# Patient Record
Sex: FEMALE | Race: WHITE | Employment: FULL TIME | URBAN - METROPOLITAN AREA
[De-identification: names, ages, dates, MRNs, and addresses within clinical notes are randomized per-mention and may not be internally consistent; named-entity substitution may affect disease eponyms.]

---

## 2017-03-17 ENCOUNTER — OFFICE VISIT (OUTPATIENT)
Dept: FAMILY MEDICINE CLINIC | Age: 52
End: 2017-03-17
Payer: COMMERCIAL

## 2017-03-17 VITALS
HEART RATE: 84 BPM | OXYGEN SATURATION: 98 % | WEIGHT: 202 LBS | DIASTOLIC BLOOD PRESSURE: 78 MMHG | SYSTOLIC BLOOD PRESSURE: 128 MMHG | BODY MASS INDEX: 31.71 KG/M2 | HEIGHT: 67 IN | RESPIRATION RATE: 16 BRPM

## 2017-03-17 DIAGNOSIS — E78.5 HYPERLIPIDEMIA, UNSPECIFIED HYPERLIPIDEMIA TYPE: ICD-10-CM

## 2017-03-17 DIAGNOSIS — E11.9 TYPE 2 DIABETES MELLITUS WITHOUT COMPLICATION, WITHOUT LONG-TERM CURRENT USE OF INSULIN (HCC): Primary | ICD-10-CM

## 2017-03-17 DIAGNOSIS — E78.2 MIXED HYPERLIPIDEMIA: ICD-10-CM

## 2017-03-17 PROCEDURE — 99214 OFFICE O/P EST MOD 30 MIN: CPT | Performed by: FAMILY MEDICINE

## 2017-03-17 ASSESSMENT — PATIENT HEALTH QUESTIONNAIRE - PHQ9
SUM OF ALL RESPONSES TO PHQ QUESTIONS 1-9: 0
SUM OF ALL RESPONSES TO PHQ9 QUESTIONS 1 & 2: 0
2. FEELING DOWN, DEPRESSED OR HOPELESS: 0
1. LITTLE INTEREST OR PLEASURE IN DOING THINGS: 0

## 2017-03-18 PROBLEM — E78.2 MIXED HYPERLIPIDEMIA: Status: ACTIVE | Noted: 2017-03-18

## 2017-03-18 PROBLEM — E11.9 DIABETES MELLITUS (HCC): Status: ACTIVE | Noted: 2017-03-18

## 2017-03-18 RX ORDER — GLIMEPIRIDE 4 MG/1
TABLET ORAL
Qty: 1 TABLET | Refills: 0
Start: 2017-03-18 | End: 2017-08-02 | Stop reason: SDUPTHER

## 2017-03-20 ENCOUNTER — HOSPITAL ENCOUNTER (OUTPATIENT)
Age: 52
Setting detail: SPECIMEN
Discharge: HOME | End: 2017-03-20
Payer: COMMERCIAL

## 2017-03-20 DIAGNOSIS — E11.9 TYPE 2 DIABETES MELLITUS WITHOUT COMPLICATION, WITHOUT LONG-TERM CURRENT USE OF INSULIN (HCC): ICD-10-CM

## 2017-03-20 DIAGNOSIS — E78.5 HYPERLIPIDEMIA, UNSPECIFIED HYPERLIPIDEMIA TYPE: ICD-10-CM

## 2017-03-20 LAB
ANION GAP SERPL CALCULATED.3IONS-SCNC: 13 MMOL/L (ref 9–17)
BUN BLDV-MCNC: 15 MG/DL (ref 6–20)
BUN/CREAT BLD: ABNORMAL (ref 9–20)
CALCIUM SERPL-MCNC: 9.3 MG/DL (ref 8.6–10.4)
CHLORIDE BLD-SCNC: 99 MMOL/L (ref 98–107)
CHOLESTEROL/HDL RATIO: 2.5
CHOLESTEROL: 196 MG/DL
CO2: 27 MMOL/L (ref 20–31)
CREAT SERPL-MCNC: 0.94 MG/DL (ref 0.5–0.9)
ESTIMATED AVERAGE GLUCOSE: 137 MG/DL
GFR AFRICAN AMERICAN: >60 ML/MIN
GFR NON-AFRICAN AMERICAN: >60 ML/MIN
GFR SERPL CREATININE-BSD FRML MDRD: ABNORMAL ML/MIN/{1.73_M2}
GFR SERPL CREATININE-BSD FRML MDRD: ABNORMAL ML/MIN/{1.73_M2}
GLUCOSE BLD-MCNC: 122 MG/DL (ref 70–99)
HBA1C MFR BLD: 6.4 % (ref 4–6)
HDLC SERPL-MCNC: 79 MG/DL
LDL CHOLESTEROL: 98 MG/DL (ref 0–130)
POTASSIUM SERPL-SCNC: 5.3 MMOL/L (ref 3.7–5.3)
SODIUM BLD-SCNC: 139 MMOL/L (ref 135–144)
TRIGL SERPL-MCNC: 93 MG/DL
VLDLC SERPL CALC-MCNC: NORMAL MG/DL (ref 1–30)

## 2017-06-16 RX ORDER — ATORVASTATIN CALCIUM 20 MG/1
20 TABLET, FILM COATED ORAL DAILY
Qty: 90 TABLET | Refills: 3 | Status: SHIPPED | OUTPATIENT
Start: 2017-06-16 | End: 2018-07-12 | Stop reason: SDUPTHER

## 2017-06-29 ENCOUNTER — HOSPITAL ENCOUNTER (OUTPATIENT)
Dept: MAMMOGRAPHY | Age: 52
Discharge: HOME | End: 2017-06-29
Payer: COMMERCIAL

## 2017-06-29 DIAGNOSIS — Z12.39 SCREENING FOR MALIGNANT NEOPLASM OF BREAST: ICD-10-CM

## 2017-06-29 PROCEDURE — 77063 BREAST TOMOSYNTHESIS BI: CPT

## 2017-08-02 RX ORDER — GLIMEPIRIDE 4 MG/1
TABLET ORAL
Qty: 90 TABLET | Refills: 1 | Status: SHIPPED | OUTPATIENT
Start: 2017-08-02 | End: 2017-08-30

## 2017-08-30 ENCOUNTER — OFFICE VISIT (OUTPATIENT)
Dept: FAMILY MEDICINE CLINIC | Age: 52
End: 2017-08-30
Payer: COMMERCIAL

## 2017-08-30 VITALS
DIASTOLIC BLOOD PRESSURE: 76 MMHG | WEIGHT: 195.2 LBS | BODY MASS INDEX: 30.64 KG/M2 | OXYGEN SATURATION: 98 % | HEART RATE: 96 BPM | HEIGHT: 67 IN | SYSTOLIC BLOOD PRESSURE: 110 MMHG

## 2017-08-30 DIAGNOSIS — M25.512 ACUTE PAIN OF LEFT SHOULDER: Primary | ICD-10-CM

## 2017-08-30 DIAGNOSIS — M25.522 LEFT ELBOW PAIN: ICD-10-CM

## 2017-08-30 DIAGNOSIS — E11.9 TYPE 2 DIABETES MELLITUS WITHOUT COMPLICATION, WITHOUT LONG-TERM CURRENT USE OF INSULIN (HCC): ICD-10-CM

## 2017-08-30 PROCEDURE — 99213 OFFICE O/P EST LOW 20 MIN: CPT | Performed by: FAMILY MEDICINE

## 2017-08-30 ASSESSMENT — ENCOUNTER SYMPTOMS
ABDOMINAL PAIN: 0
SORE THROAT: 0
COLOR CHANGE: 0
EYE REDNESS: 0
CHEST TIGHTNESS: 0
SHORTNESS OF BREATH: 0
BLOOD IN STOOL: 0
EYE DISCHARGE: 0
EYE ITCHING: 0
NAUSEA: 0
VOICE CHANGE: 0
BACK PAIN: 0
COUGH: 0
SINUS PRESSURE: 0
EYE PAIN: 0
RHINORRHEA: 0
FACIAL SWELLING: 0
VOMITING: 0
PHOTOPHOBIA: 0
WHEEZING: 0
DIARRHEA: 0
ABDOMINAL DISTENTION: 0
CONSTIPATION: 0

## 2017-09-12 ENCOUNTER — TELEPHONE (OUTPATIENT)
Dept: FAMILY MEDICINE CLINIC | Age: 52
End: 2017-09-12

## 2017-09-15 ENCOUNTER — OFFICE VISIT (OUTPATIENT)
Dept: FAMILY MEDICINE CLINIC | Age: 52
End: 2017-09-15
Payer: COMMERCIAL

## 2017-09-15 VITALS
BODY MASS INDEX: 30.51 KG/M2 | OXYGEN SATURATION: 98 % | WEIGHT: 194.4 LBS | HEIGHT: 67 IN | DIASTOLIC BLOOD PRESSURE: 78 MMHG | HEART RATE: 81 BPM | SYSTOLIC BLOOD PRESSURE: 120 MMHG

## 2017-09-15 DIAGNOSIS — M75.52 BURSITIS OF LEFT SHOULDER: Primary | ICD-10-CM

## 2017-09-15 PROCEDURE — 20610 DRAIN/INJ JOINT/BURSA W/O US: CPT | Performed by: FAMILY MEDICINE

## 2017-09-15 RX ORDER — TRIAMCINOLONE ACETONIDE 40 MG/ML
120 INJECTION, SUSPENSION INTRA-ARTICULAR; INTRAMUSCULAR ONCE
Status: COMPLETED | OUTPATIENT
Start: 2017-09-15 | End: 2017-09-15

## 2017-09-15 RX ADMIN — TRIAMCINOLONE ACETONIDE 120 MG: 40 INJECTION, SUSPENSION INTRA-ARTICULAR; INTRAMUSCULAR at 07:36

## 2017-09-15 ASSESSMENT — ENCOUNTER SYMPTOMS
EYE DISCHARGE: 0
COUGH: 0
WHEEZING: 0
ABDOMINAL DISTENTION: 0
EYE REDNESS: 0
VOICE CHANGE: 0
CONSTIPATION: 0
RHINORRHEA: 0
EYE ITCHING: 0
BLOOD IN STOOL: 0
FACIAL SWELLING: 0
NAUSEA: 0
SINUS PRESSURE: 0
EYE PAIN: 0
DIARRHEA: 0
COLOR CHANGE: 0
SORE THROAT: 0
SHORTNESS OF BREATH: 0
CHEST TIGHTNESS: 0
ABDOMINAL PAIN: 0
VOMITING: 0
BACK PAIN: 0
PHOTOPHOBIA: 0

## 2017-09-18 ENCOUNTER — TELEPHONE (OUTPATIENT)
Dept: FAMILY MEDICINE CLINIC | Age: 52
End: 2017-09-18

## 2017-10-03 ENCOUNTER — TELEPHONE (OUTPATIENT)
Dept: FAMILY MEDICINE CLINIC | Age: 52
End: 2017-10-03

## 2017-10-03 NOTE — TELEPHONE ENCOUNTER
I did send them over and the problem was that she has a high deductible plan so it was maxing out the card benefit and didn't bring it down enough. So the options that in light of the high deductible plan would be either keep giving her samples or we could change her to a medication where they cover the whole cost on the copay card (but the options there would be limited).

## 2017-10-24 ENCOUNTER — TELEPHONE (OUTPATIENT)
Dept: FAMILY MEDICINE CLINIC | Age: 52
End: 2017-10-24

## 2017-11-17 ENCOUNTER — TELEPHONE (OUTPATIENT)
Dept: FAMILY MEDICINE CLINIC | Age: 52
End: 2017-11-17

## 2017-11-17 NOTE — TELEPHONE ENCOUNTER
That's fine (just an FYI though, if she's only taking it once a day then the 100/1000 dose would be more appropriate).

## 2017-12-19 ENCOUNTER — PATIENT MESSAGE (OUTPATIENT)
Dept: FAMILY MEDICINE CLINIC | Age: 52
End: 2017-12-19

## 2018-01-18 ENCOUNTER — PATIENT MESSAGE (OUTPATIENT)
Dept: FAMILY MEDICINE CLINIC | Age: 53
End: 2018-01-18

## 2018-06-26 ENCOUNTER — HOSPITAL ENCOUNTER (OUTPATIENT)
Dept: MAMMOGRAPHY | Age: 53
Discharge: HOME | End: 2018-06-28
Payer: COMMERCIAL

## 2018-06-26 DIAGNOSIS — Z12.31 VISIT FOR SCREENING MAMMOGRAM: ICD-10-CM

## 2018-06-26 PROCEDURE — 77063 BREAST TOMOSYNTHESIS BI: CPT

## 2018-07-13 RX ORDER — ATORVASTATIN CALCIUM 20 MG/1
TABLET, FILM COATED ORAL
Qty: 90 TABLET | Refills: 3 | Status: SHIPPED | OUTPATIENT
Start: 2018-07-13 | End: 2019-04-30 | Stop reason: SDUPTHER

## 2018-11-30 ENCOUNTER — TELEPHONE (OUTPATIENT)
Dept: FAMILY MEDICINE CLINIC | Age: 53
End: 2018-11-30

## 2019-02-25 ENCOUNTER — OFFICE VISIT (OUTPATIENT)
Dept: FAMILY MEDICINE CLINIC | Age: 54
End: 2019-02-25
Payer: COMMERCIAL

## 2019-02-25 VITALS
OXYGEN SATURATION: 97 % | HEIGHT: 66 IN | SYSTOLIC BLOOD PRESSURE: 130 MMHG | DIASTOLIC BLOOD PRESSURE: 80 MMHG | BODY MASS INDEX: 31.08 KG/M2 | WEIGHT: 193.4 LBS | HEART RATE: 95 BPM

## 2019-02-25 DIAGNOSIS — E11.9 TYPE 2 DIABETES MELLITUS WITHOUT COMPLICATION, WITHOUT LONG-TERM CURRENT USE OF INSULIN (HCC): ICD-10-CM

## 2019-02-25 DIAGNOSIS — E78.2 MIXED HYPERLIPIDEMIA: ICD-10-CM

## 2019-02-25 DIAGNOSIS — Z11.4 SCREENING FOR HIV (HUMAN IMMUNODEFICIENCY VIRUS): ICD-10-CM

## 2019-02-25 DIAGNOSIS — Z23 IMMUNIZATION DUE: ICD-10-CM

## 2019-02-25 DIAGNOSIS — Z11.59 ENCOUNTER FOR HEPATITIS C SCREENING TEST FOR LOW RISK PATIENT: ICD-10-CM

## 2019-02-25 DIAGNOSIS — Z00.01 ENCOUNTER FOR GENERAL ADULT MEDICAL EXAMINATION WITH ABNORMAL FINDINGS: Primary | ICD-10-CM

## 2019-02-25 DIAGNOSIS — Z51.81 MEDICATION MONITORING ENCOUNTER: ICD-10-CM

## 2019-02-25 LAB
CREATININE URINE POCT: 50
HBA1C MFR BLD: 6.6 %
MICROALBUMIN/CREAT 24H UR: 10 MG/G{CREAT}
MICROALBUMIN/CREAT UR-RTO: 30

## 2019-02-25 PROCEDURE — 99396 PREV VISIT EST AGE 40-64: CPT | Performed by: FAMILY MEDICINE

## 2019-02-25 PROCEDURE — 83036 HEMOGLOBIN GLYCOSYLATED A1C: CPT | Performed by: FAMILY MEDICINE

## 2019-02-25 PROCEDURE — 82044 UR ALBUMIN SEMIQUANTITATIVE: CPT | Performed by: FAMILY MEDICINE

## 2019-02-25 RX ORDER — LISINOPRIL 2.5 MG/1
2.5 TABLET ORAL DAILY
Qty: 90 TABLET | Refills: 3 | Status: SHIPPED | OUTPATIENT
Start: 2019-02-25 | End: 2020-01-17

## 2019-02-25 RX ORDER — IBUPROFEN 800 MG/1
800 TABLET ORAL 3 TIMES DAILY PRN
Qty: 270 TABLET | Refills: 1 | Status: SHIPPED | OUTPATIENT
Start: 2019-02-25 | End: 2021-07-13 | Stop reason: SDUPTHER

## 2019-02-25 ASSESSMENT — ENCOUNTER SYMPTOMS
PHOTOPHOBIA: 0
VOMITING: 0
CONSTIPATION: 0
COUGH: 0
EYE PAIN: 0
SORE THROAT: 0
BACK PAIN: 0
VOICE CHANGE: 0
SINUS PRESSURE: 0
RHINORRHEA: 0
EYE REDNESS: 0
BLOOD IN STOOL: 0
FACIAL SWELLING: 0
EYE ITCHING: 0
ABDOMINAL DISTENTION: 0
WHEEZING: 0
ABDOMINAL PAIN: 0
EYE DISCHARGE: 0
COLOR CHANGE: 0
NAUSEA: 0
SHORTNESS OF BREATH: 0
DIARRHEA: 0
CHEST TIGHTNESS: 0

## 2019-02-25 ASSESSMENT — PATIENT HEALTH QUESTIONNAIRE - PHQ9
SUM OF ALL RESPONSES TO PHQ QUESTIONS 1-9: 0
SUM OF ALL RESPONSES TO PHQ QUESTIONS 1-9: 0
1. LITTLE INTEREST OR PLEASURE IN DOING THINGS: 0
2. FEELING DOWN, DEPRESSED OR HOPELESS: 0
SUM OF ALL RESPONSES TO PHQ9 QUESTIONS 1 & 2: 0

## 2019-02-27 ENCOUNTER — HOSPITAL ENCOUNTER (OUTPATIENT)
Age: 54
Setting detail: SPECIMEN
Discharge: HOME | End: 2019-02-27
Payer: COMMERCIAL

## 2019-02-27 DIAGNOSIS — Z11.59 ENCOUNTER FOR HEPATITIS C SCREENING TEST FOR LOW RISK PATIENT: ICD-10-CM

## 2019-02-27 DIAGNOSIS — E78.2 MIXED HYPERLIPIDEMIA: ICD-10-CM

## 2019-02-27 DIAGNOSIS — Z00.01 ENCOUNTER FOR GENERAL ADULT MEDICAL EXAMINATION WITH ABNORMAL FINDINGS: ICD-10-CM

## 2019-02-27 DIAGNOSIS — Z11.4 SCREENING FOR HIV (HUMAN IMMUNODEFICIENCY VIRUS): ICD-10-CM

## 2019-02-27 DIAGNOSIS — Z51.81 MEDICATION MONITORING ENCOUNTER: ICD-10-CM

## 2019-02-27 LAB
ALBUMIN SERPL-MCNC: 4.5 G/DL (ref 3.5–5.2)
ALBUMIN/GLOBULIN RATIO: 1.8 (ref 1–2.5)
ALP BLD-CCNC: 105 U/L (ref 35–104)
ALT SERPL-CCNC: 20 U/L (ref 5–33)
ANION GAP SERPL CALCULATED.3IONS-SCNC: 14 MMOL/L (ref 9–17)
AST SERPL-CCNC: 20 U/L
BILIRUB SERPL-MCNC: 0.42 MG/DL (ref 0.3–1.2)
BUN BLDV-MCNC: 14 MG/DL (ref 6–20)
BUN/CREAT BLD: ABNORMAL (ref 9–20)
CALCIUM SERPL-MCNC: 9.5 MG/DL (ref 8.6–10.4)
CHLORIDE BLD-SCNC: 105 MMOL/L (ref 98–107)
CHOLESTEROL/HDL RATIO: 2.5
CHOLESTEROL: 189 MG/DL
CO2: 23 MMOL/L (ref 20–31)
CREAT SERPL-MCNC: 0.86 MG/DL (ref 0.5–0.9)
GFR AFRICAN AMERICAN: >60 ML/MIN
GFR NON-AFRICAN AMERICAN: >60 ML/MIN
GFR SERPL CREATININE-BSD FRML MDRD: ABNORMAL ML/MIN/{1.73_M2}
GFR SERPL CREATININE-BSD FRML MDRD: ABNORMAL ML/MIN/{1.73_M2}
GLUCOSE BLD-MCNC: 126 MG/DL (ref 70–99)
HDLC SERPL-MCNC: 76 MG/DL
HEPATITIS C ANTIBODY: NONREACTIVE
HIV AG/AB: NONREACTIVE
LDL CHOLESTEROL: 100 MG/DL (ref 0–130)
POTASSIUM SERPL-SCNC: 4.8 MMOL/L (ref 3.7–5.3)
SODIUM BLD-SCNC: 142 MMOL/L (ref 135–144)
TOTAL PROTEIN: 7 G/DL (ref 6.4–8.3)
TRIGL SERPL-MCNC: 66 MG/DL
TSH SERPL DL<=0.05 MIU/L-ACNC: 3.59 MIU/L (ref 0.3–5)
VLDLC SERPL CALC-MCNC: NORMAL MG/DL (ref 1–30)

## 2019-04-30 RX ORDER — ATORVASTATIN CALCIUM 20 MG/1
20 TABLET, FILM COATED ORAL DAILY
Qty: 90 TABLET | Refills: 3 | Status: SHIPPED | OUTPATIENT
Start: 2019-04-30 | End: 2019-05-21

## 2019-08-08 ENCOUNTER — HOSPITAL ENCOUNTER (OUTPATIENT)
Dept: MAMMOGRAPHY | Age: 54
Discharge: HOME | End: 2019-08-10
Payer: COMMERCIAL

## 2019-08-08 DIAGNOSIS — Z12.31 ENCOUNTER FOR SCREENING MAMMOGRAM FOR BREAST CANCER: ICD-10-CM

## 2019-08-08 PROCEDURE — 77063 BREAST TOMOSYNTHESIS BI: CPT

## 2019-08-26 ENCOUNTER — NURSE ONLY (OUTPATIENT)
Dept: FAMILY MEDICINE CLINIC | Age: 54
End: 2019-08-26
Payer: COMMERCIAL

## 2019-08-26 DIAGNOSIS — E11.9 TYPE 2 DIABETES MELLITUS WITHOUT COMPLICATION, WITHOUT LONG-TERM CURRENT USE OF INSULIN (HCC): Primary | ICD-10-CM

## 2019-08-26 LAB — HBA1C MFR BLD: 6.5 %

## 2019-08-26 PROCEDURE — 83036 HEMOGLOBIN GLYCOSYLATED A1C: CPT | Performed by: FAMILY MEDICINE

## 2020-01-17 ENCOUNTER — OFFICE VISIT (OUTPATIENT)
Dept: FAMILY MEDICINE CLINIC | Age: 55
End: 2020-01-17
Payer: COMMERCIAL

## 2020-01-17 VITALS
BODY MASS INDEX: 31.54 KG/M2 | OXYGEN SATURATION: 98 % | HEART RATE: 77 BPM | DIASTOLIC BLOOD PRESSURE: 70 MMHG | SYSTOLIC BLOOD PRESSURE: 110 MMHG | WEIGHT: 195.4 LBS | TEMPERATURE: 97.6 F

## 2020-01-17 PROBLEM — R87.619 ABNORMAL CERVICAL PAPANICOLAOU SMEAR: Status: ACTIVE | Noted: 2017-05-24

## 2020-01-17 LAB — HBA1C MFR BLD: 6.8 %

## 2020-01-17 PROCEDURE — 83036 HEMOGLOBIN GLYCOSYLATED A1C: CPT | Performed by: NURSE PRACTITIONER

## 2020-01-17 PROCEDURE — 99214 OFFICE O/P EST MOD 30 MIN: CPT | Performed by: NURSE PRACTITIONER

## 2020-01-17 RX ORDER — ATORVASTATIN CALCIUM 40 MG/1
40 TABLET, FILM COATED ORAL DAILY
Qty: 90 TABLET | Refills: 1 | Status: SHIPPED | OUTPATIENT
Start: 2020-01-17 | End: 2020-08-03

## 2020-01-17 ASSESSMENT — PATIENT HEALTH QUESTIONNAIRE - PHQ9
SUM OF ALL RESPONSES TO PHQ QUESTIONS 1-9: 0
2. FEELING DOWN, DEPRESSED OR HOPELESS: 0
1. LITTLE INTEREST OR PLEASURE IN DOING THINGS: 0
SUM OF ALL RESPONSES TO PHQ9 QUESTIONS 1 & 2: 0
SUM OF ALL RESPONSES TO PHQ QUESTIONS 1-9: 0

## 2020-01-17 ASSESSMENT — ENCOUNTER SYMPTOMS
SHORTNESS OF BREATH: 0
COUGH: 0

## 2020-01-17 NOTE — PROGRESS NOTES
Patient is present to establish care  No concerns at this time    Pharmacy and medication reviewed with patient

## 2020-01-28 ENCOUNTER — HOSPITAL ENCOUNTER (EMERGENCY)
Age: 55
Discharge: HOME | End: 2020-01-28
Attending: EMERGENCY MEDICINE
Payer: COMMERCIAL

## 2020-01-28 VITALS
TEMPERATURE: 97.9 F | WEIGHT: 199.4 LBS | OXYGEN SATURATION: 99 % | SYSTOLIC BLOOD PRESSURE: 165 MMHG | HEART RATE: 93 BPM | DIASTOLIC BLOOD PRESSURE: 85 MMHG | RESPIRATION RATE: 16 BRPM | BODY MASS INDEX: 31.3 KG/M2 | HEIGHT: 67 IN

## 2020-01-28 PROCEDURE — 6360000002 HC RX W HCPCS: Performed by: EMERGENCY MEDICINE

## 2020-01-28 PROCEDURE — 90471 IMMUNIZATION ADMIN: CPT | Performed by: EMERGENCY MEDICINE

## 2020-01-28 PROCEDURE — 6370000000 HC RX 637 (ALT 250 FOR IP): Performed by: EMERGENCY MEDICINE

## 2020-01-28 PROCEDURE — 90715 TDAP VACCINE 7 YRS/> IM: CPT | Performed by: EMERGENCY MEDICINE

## 2020-01-28 PROCEDURE — 99282 EMERGENCY DEPT VISIT SF MDM: CPT

## 2020-01-28 RX ORDER — AMOXICILLIN AND CLAVULANATE POTASSIUM 875; 125 MG/1; MG/1
1 TABLET, FILM COATED ORAL ONCE
Status: COMPLETED | OUTPATIENT
Start: 2020-01-28 | End: 2020-01-28

## 2020-01-28 RX ORDER — HYDROCODONE BITARTRATE AND ACETAMINOPHEN 5; 325 MG/1; MG/1
1 TABLET ORAL EVERY 6 HOURS PRN
Qty: 20 TABLET | Refills: 0 | Status: SHIPPED | OUTPATIENT
Start: 2020-01-28 | End: 2020-01-31

## 2020-01-28 RX ORDER — AMOXICILLIN AND CLAVULANATE POTASSIUM 875; 125 MG/1; MG/1
1 TABLET, FILM COATED ORAL 2 TIMES DAILY
Qty: 14 TABLET | Refills: 0 | Status: SHIPPED | OUTPATIENT
Start: 2020-01-28 | End: 2020-02-04

## 2020-01-28 RX ORDER — HYDROCODONE BITARTRATE AND ACETAMINOPHEN 5; 325 MG/1; MG/1
1 TABLET ORAL ONCE
Status: COMPLETED | OUTPATIENT
Start: 2020-01-28 | End: 2020-01-28

## 2020-01-28 RX ADMIN — TETANUS TOXOID, REDUCED DIPHTHERIA TOXOID AND ACELLULAR PERTUSSIS VACCINE, ADSORBED 0.5 ML: 5; 2.5; 8; 8; 2.5 SUSPENSION INTRAMUSCULAR at 02:46

## 2020-01-28 RX ADMIN — HYDROCODONE BITARTRATE AND ACETAMINOPHEN 1 TABLET: 5; 325 TABLET ORAL at 02:46

## 2020-01-28 RX ADMIN — AMOXICILLIN AND CLAVULANATE POTASSIUM 1 TABLET: 875; 125 TABLET, FILM COATED ORAL at 02:46

## 2020-01-28 ASSESSMENT — PAIN DESCRIPTION - DESCRIPTORS: DESCRIPTORS: BURNING

## 2020-01-28 ASSESSMENT — PAIN DESCRIPTION - ONSET: ONSET: ON-GOING

## 2020-01-28 ASSESSMENT — PAIN DESCRIPTION - PROGRESSION: CLINICAL_PROGRESSION: NOT CHANGED

## 2020-01-28 ASSESSMENT — PAIN DESCRIPTION - PAIN TYPE: TYPE: ACUTE PAIN

## 2020-01-28 ASSESSMENT — PAIN DESCRIPTION - FREQUENCY: FREQUENCY: CONTINUOUS

## 2020-01-28 ASSESSMENT — PAIN DESCRIPTION - LOCATION: LOCATION: MOUTH

## 2020-01-28 ASSESSMENT — PAIN SCALES - GENERAL
PAINLEVEL_OUTOF10: 5
PAINLEVEL_OUTOF10: 5

## 2020-01-28 ASSESSMENT — PAIN DESCRIPTION - ORIENTATION: ORIENTATION: RIGHT

## 2020-01-28 NOTE — ED PROVIDER NOTES
[address] [place] [address] ED  eMERGENCY dEPARTMENT eNCOUnter      Pt Name: Vick Dickson  MRN: [id number]  [place] 1965  Date of evaluation: 2020  Provider: Grayson Leyden, MD    CHIEF COMPLAINT       Chief Complaint   Patient presents with    Animal Bite     right side of mouth/upper lip         HISTORY OF PRESENT ILLNESS  (Location/Symptom, Timing/Onset, Context/Setting, Quality, Duration, Modifying Factors, Severity.)   Vick Dickson is a 62 y.o. female who presents to the emergency department for evaluation of a dog bite on her face. Patient states she startled her own dog. They have had this dog for about 16 years. He was in bed. She was getting back into bed. The dog bit her above the right lip. She has a rather complicated laceration it is not through and through is jagged in appearance however with multiple facets of involvement. No other area of injury noted. Dog shots are up-to-date      Nursing Notes were reviewed. ALLERGIES     Sulfa antibiotics    CURRENT MEDICATIONS       Previous Medications    ATORVASTATIN (LIPITOR) 40 MG TABLET    Take 1 tablet by mouth daily    IBUPROFEN (ADVIL;MOTRIN) 800 MG TABLET    Take 1 tablet by mouth 3 times daily as needed for Pain    METFORMIN (GLUCOPHAGE) 1000 MG TABLET    take 1 tablet by mouth twice a day with meals    MULTIPLE VITAMINS-MINERALS (MULTIVITAMIN PO)    Take  by mouth. PAST MEDICAL HISTORY         Diagnosis Date    Diabetes mellitus ([address]        SURGICAL HISTORY           Procedure Laterality Date    BREAST SURGERY Bilateral     reduction    BUNIONECTOMY Bilateral     COLONOSCOPY  14         FAMILY HISTORY           Problem Relation Age of Onset    Stroke Mother      Family Status   Relation Name Status    Mother      Father  Alive        SOCIAL HISTORY      reports that she has never smoked. She has never used smokeless tobacco. She reports current alcohol use.  She reports that she does not use drugs.    REVIEW OF SYSTEMS    (2-9 systems for level 4, 10 or more for level 5)     Review of Systems   All other systems reviewed and are negative. Except as noted above the remainder of the review of systems was reviewed and negative. PHYSICAL EXAM    (up to 7 for level 4, 8 or more for level 5)     Vitals:    01/28/20 0023   BP: (!) 165/85   Pulse: 93   Resp: 16   Temp: 97.9 °F (36.6 °C)   TempSrc: Oral   SpO2: 99%   Weight: 199 lb 6.4 oz (90.4 kg)   Height: 5' 7\" (1.702 m)       Physical exam reflects a well-nourished well-hydrated female. She is afebrile with stable vital signs to include pulse ox of 99% on room air. She is not hypoxic. She is alert conversive and appropriate in behavior. Only area of involvement is above the upper lip on the right. She has a curvilinear jagged laceration approximately 3 cm in total length with a small less than 1 cm laceration in each end. It is not through and through there is no intraoral involvement. She has no other area of wound. Remainder of exam to include chest abdomen extremities without evidence of or complaint of injury      DIAGNOSTIC RESULTS       EMERGENCY DEPARTMENT COURSE and DIFFERENTIAL DIAGNOSIS/MDM:   Vitals:    Vitals:    01/28/20 0023   BP: (!) 165/85   Pulse: 93   Resp: 16   Temp: 97.9 °F (36.6 °C)   TempSrc: Oral   SpO2: 99%   Weight: 199 lb 6.4 oz (90.4 kg)   Height: 5' 7\" (1.702 m)     Patient is evaluated. Wound is evaluated and sutured. Tetanus shot is updated. She is covered for pain she is placed on antibiotics. Sutures are absorbable and will not require removal.  She is discharged home on conservative management    CONSULTS:  None    PROCEDURES:  Wound closure: Facial laceration. 3 cm in length.-Area is cleansed with sterile saline. Local anesthesia with 1% lidocaine plain approximately 60 mils total.  Margins are aligned is much as possible attempt made to align the vermilion border as much as possible.   Wound is sutured

## 2020-01-28 NOTE — ED NOTES
Patient presents to ED s/p being bit by her dog on the upper right side of her lip. Laceration present, sutured per Dr. Nik Guillen. Patient AOx4 and in NAD in room. Will continue to monitor.       Shelia Zelaya RN  01/28/20 0525

## 2020-04-04 ENCOUNTER — HOSPITAL ENCOUNTER (EMERGENCY)
Age: 55
Discharge: HOME | End: 2020-04-04
Attending: EMERGENCY MEDICINE
Payer: COMMERCIAL

## 2020-04-04 ENCOUNTER — APPOINTMENT (OUTPATIENT)
Dept: GENERAL RADIOLOGY | Age: 55
End: 2020-04-04
Payer: COMMERCIAL

## 2020-04-04 VITALS
BODY MASS INDEX: 30.45 KG/M2 | WEIGHT: 194 LBS | SYSTOLIC BLOOD PRESSURE: 133 MMHG | RESPIRATION RATE: 12 BRPM | TEMPERATURE: 97.9 F | DIASTOLIC BLOOD PRESSURE: 85 MMHG | HEART RATE: 71 BPM | OXYGEN SATURATION: 98 % | HEIGHT: 67 IN

## 2020-04-04 LAB
% CKMB: 2.2 % (ref 0–3)
ABSOLUTE EOS #: 0.59 K/UL (ref 0–0.44)
ABSOLUTE IMMATURE GRANULOCYTE: 0.02 K/UL (ref 0–0.3)
ABSOLUTE LYMPH #: 4.27 K/UL (ref 1.1–3.7)
ABSOLUTE MONO #: 0.73 K/UL (ref 0.1–1.2)
ANION GAP SERPL CALCULATED.3IONS-SCNC: 14 MMOL/L (ref 9–17)
BASOPHILS # BLD: 1 % (ref 0–2)
BASOPHILS ABSOLUTE: 0.09 K/UL (ref 0–0.2)
BNP INTERPRETATION: NORMAL
BUN BLDV-MCNC: 13 MG/DL (ref 6–20)
BUN/CREAT BLD: 15 (ref 9–20)
CALCIUM SERPL-MCNC: 9.7 MG/DL (ref 8.6–10.4)
CHLORIDE BLD-SCNC: 98 MMOL/L (ref 98–107)
CK MB: 1.5 NG/ML
CKMB INTERPRETATION: NORMAL
CO2: 24 MMOL/L (ref 20–31)
CREAT SERPL-MCNC: 0.84 MG/DL (ref 0.5–0.9)
DIFFERENTIAL TYPE: ABNORMAL
EOSINOPHILS RELATIVE PERCENT: 6 % (ref 1–4)
GFR AFRICAN AMERICAN: >60 ML/MIN
GFR NON-AFRICAN AMERICAN: >60 ML/MIN
GFR SERPL CREATININE-BSD FRML MDRD: ABNORMAL ML/MIN/{1.73_M2}
GFR SERPL CREATININE-BSD FRML MDRD: ABNORMAL ML/MIN/{1.73_M2}
GLUCOSE BLD-MCNC: 177 MG/DL (ref 70–99)
HCT VFR BLD CALC: 38.3 % (ref 36.3–47.1)
HEMOGLOBIN: 12.6 G/DL (ref 11.9–15.1)
IMMATURE GRANULOCYTES: 0 %
LYMPHOCYTES # BLD: 41 % (ref 24–43)
MAGNESIUM: 1.8 MG/DL (ref 1.6–2.6)
MCH RBC QN AUTO: 31 PG (ref 25.2–33.5)
MCHC RBC AUTO-ENTMCNC: 32.9 G/DL (ref 28.4–34.8)
MCV RBC AUTO: 94.3 FL (ref 82.6–102.9)
MONOCYTES # BLD: 7 % (ref 3–12)
MYOGLOBIN: <21 NG/ML (ref 25–58)
NRBC AUTOMATED: 0 PER 100 WBC
PDW BLD-RTO: 12 % (ref 11.8–14.4)
PLATELET # BLD: 331 K/UL (ref 138–453)
PLATELET ESTIMATE: ABNORMAL
PMV BLD AUTO: 9.3 FL (ref 8.1–13.5)
POTASSIUM SERPL-SCNC: 3.8 MMOL/L (ref 3.7–5.3)
PRO-BNP: 49 PG/ML
RBC # BLD: 4.06 M/UL (ref 3.95–5.11)
RBC # BLD: ABNORMAL 10*6/UL
SEG NEUTROPHILS: 45 % (ref 36–65)
SEGMENTED NEUTROPHILS ABSOLUTE COUNT: 4.82 K/UL (ref 1.5–8.1)
SODIUM BLD-SCNC: 136 MMOL/L (ref 135–144)
THYROXINE, FREE: 0.94 NG/DL (ref 0.93–1.7)
TOTAL CK: 67 U/L (ref 26–192)
TROPONIN INTERP: ABNORMAL
TROPONIN T: ABNORMAL NG/ML
TROPONIN, HIGH SENSITIVITY: <6 NG/L (ref 0–14)
TSH SERPL DL<=0.05 MIU/L-ACNC: 7.63 MIU/L (ref 0.3–5)
WBC # BLD: 10.5 K/UL (ref 3.5–11.3)
WBC # BLD: ABNORMAL 10*3/UL

## 2020-04-04 PROCEDURE — 71045 X-RAY EXAM CHEST 1 VIEW: CPT

## 2020-04-04 PROCEDURE — 83874 ASSAY OF MYOGLOBIN: CPT

## 2020-04-04 PROCEDURE — 84439 ASSAY OF FREE THYROXINE: CPT

## 2020-04-04 PROCEDURE — 80048 BASIC METABOLIC PNL TOTAL CA: CPT

## 2020-04-04 PROCEDURE — 83880 ASSAY OF NATRIURETIC PEPTIDE: CPT

## 2020-04-04 PROCEDURE — 93005 ELECTROCARDIOGRAM TRACING: CPT | Performed by: EMERGENCY MEDICINE

## 2020-04-04 PROCEDURE — 83735 ASSAY OF MAGNESIUM: CPT

## 2020-04-04 PROCEDURE — 84484 ASSAY OF TROPONIN QUANT: CPT

## 2020-04-04 PROCEDURE — 84443 ASSAY THYROID STIM HORMONE: CPT

## 2020-04-04 PROCEDURE — 99285 EMERGENCY DEPT VISIT HI MDM: CPT

## 2020-04-04 PROCEDURE — 82553 CREATINE MB FRACTION: CPT

## 2020-04-04 PROCEDURE — 82550 ASSAY OF CK (CPK): CPT

## 2020-04-04 PROCEDURE — 85025 COMPLETE CBC W/AUTO DIFF WBC: CPT

## 2020-04-04 RX ORDER — METOPROLOL SUCCINATE 25 MG/1
25 TABLET, EXTENDED RELEASE ORAL NIGHTLY
Qty: 30 TABLET | Refills: 1 | Status: SHIPPED | OUTPATIENT
Start: 2020-04-04 | End: 2020-09-15

## 2020-04-04 ASSESSMENT — PAIN DESCRIPTION - PAIN TYPE: TYPE: ACUTE PAIN

## 2020-04-04 ASSESSMENT — PAIN SCALES - GENERAL: PAINLEVEL_OUTOF10: 1

## 2020-04-05 NOTE — ED NOTES
Pt resting in bed no s/s of distress. Pt states she has felt her skip a beat that's what has brought her in. Pt states she has had a rapid heart rate that she thinks has been coming and going for about three weeks.      Franki Yap RN  04/04/20 4856

## 2020-04-05 NOTE — ED PROVIDER NOTES
[address] [place] [address] ED  eMERGENCY dEPARTMENT eNCOUnter      Pt Name: Jannette Ya  MRN: [id number]  [place] 1965  Date of evaluation: 2020  Provider: Kenneth Reddy MD    CHIEF COMPLAINT       Chief Complaint   Patient presents with    Palpitations         HISTORY OF PRESENT ILLNESS  (Location/Symptom, Timing/Onset, Context/Setting, Quality, Duration, Modifying Factors, Severity.)   Jannette Ya is a 62 y.o. female who presents to the emergency department for evaluation of palpitations. Patient states she had onset of symptoms about 3 weeks ago. She will feel like her heart \"skips a beat\". She denies a rapid sensation. She states she just feels like there is a pause. Her  has a home pulse oximeter some sort and she states when she watches her heart rate and rhythm it seems like she will experience pauses or episodes of decreasing rate. She states tonight her symptoms seem to be worse. She states she \"is not certain if she is over thinking it\". She presents \"just to be checked\". No chest pain or shortness of breath no syncope presyncope dizziness or headache symptoms. Nursing Notes were reviewed. ALLERGIES     Sulfa antibiotics    CURRENT MEDICATIONS       Previous Medications    ATORVASTATIN (LIPITOR) 40 MG TABLET    Take 1 tablet by mouth daily    IBUPROFEN (ADVIL;MOTRIN) 800 MG TABLET    Take 1 tablet by mouth 3 times daily as needed for Pain    METFORMIN (GLUCOPHAGE) 1000 MG TABLET    take 1 tablet by mouth twice a day with meals    MULTIPLE VITAMINS-MINERALS (MULTIVITAMIN PO)    Take  by mouth.        PAST MEDICAL HISTORY         Diagnosis Date    Diabetes mellitus [address] [address]        SURGICAL HISTORY           Procedure Laterality Date    BREAST SURGERY Bilateral     reduction    BUNIONECTOMY Bilateral     COLONOSCOPY  14         FAMILY HISTORY           Problem Relation Age of Onset    Stroke Mother      Family Status   Relation Name Status    Mother      Father  Alive        SOCIAL HISTORY      reports that she has never smoked. She has never used smokeless tobacco. She reports current alcohol use. She reports that she does not use drugs. REVIEW OF SYSTEMS    (2-9 systems for level 4, 10 or more for level 5)     Review of Systems   All other systems reviewed and are negative. Except as noted above the remainder of the review of systems was reviewed and negative. PHYSICAL EXAM    (up to 7 for level 4, 8 or more for level 5)     Vitals:    04/04/20 2212 04/04/20 2227 04/04/20 2242 04/04/20 2312   BP: (!) 150/95 (!) 140/88 (!) 148/84 (!) 143/82   Pulse: 79 74 73 73   Resp: 16 13 16 15   Temp:       TempSrc:       SpO2: 97% 99% 98% 97%   Weight:       Height:           Physical exam reflects a well-nourished well-hydrated female. She is afebrile with stable vital signs to include pulse ox of 100% on room air. She is not hypoxic. She is alert conversive and appropriate behavior. Integument warm and dry. Oropharyngeal exam is without lesion. Trachea midline. No cervical lymphadenopathy JVD or bruits. Heart currently regular rate and rhythm normal S1-S2 no murmurs rubs gallops. Sinus rhythm on the monitor. Abdomen soft throughout. Extremities show no gross abnormality. Integument without rash or lesion. She has no acute neurovascular deficits      DIAGNOSTIC RESULTS     EKG: All EKG's are interpreted by the Emergency Department Physician who either signs or Co-signs this chart in the absence of a cardiologist.    EKG demonstrates sinus rhythm rate of 80. No ectopy or ischemic change noted. Allowing for lead placement tracing is consistent with prior.     RADIOLOGY:   Non-plain film images such as CT, Ultrasound and MRI are read by the radiologist. Plain radiographic images are visualized and preliminarily interpreted by the emergency physician with the below findings:      Interpretation per the Radiologist below, if available at the time of this

## 2020-04-06 LAB
EKG ATRIAL RATE: 81 BPM
EKG P AXIS: 32 DEGREES
EKG P-R INTERVAL: 164 MS
EKG Q-T INTERVAL: 374 MS
EKG QRS DURATION: 88 MS
EKG QTC CALCULATION (BAZETT): 434 MS
EKG R AXIS: -27 DEGREES
EKG T AXIS: 48 DEGREES
EKG VENTRICULAR RATE: 81 BPM

## 2020-04-06 PROCEDURE — 93010 ELECTROCARDIOGRAM REPORT: CPT | Performed by: INTERNAL MEDICINE

## 2020-04-16 ENCOUNTER — TELEPHONE (OUTPATIENT)
Dept: FAMILY MEDICINE CLINIC | Age: 55
End: 2020-04-16

## 2020-04-16 NOTE — TELEPHONE ENCOUNTER
Pt called requesting a call back regarding getting a referral for Dr Daniella Cole contact pt back at [phone]

## 2020-05-04 NOTE — TELEPHONE ENCOUNTER
Last visit: 01/17/2020  Last Med refill: 03/31/2020  Does patient have enough medication for 72 hours: Yes    Next Visit Date:  No future appointments. Health Maintenance   Topic Date Due    Hepatitis B vaccine (1 of 3 - Risk 3-dose series) 09/21/1982    Shingles Vaccine (1 of 2) 09/21/2013    Diabetic retinal exam  04/01/2016    Cervical cancer screen  03/01/2017    Diabetic foot exam  02/25/2020    Diabetic microalbuminuria test  02/25/2020    Lipid screen  02/27/2020    Pneumococcal 0-64 years Vaccine (1 of 1 - PPSV23) 01/17/2021 (Originally 9/21/1971)    Flu vaccine (Season Ended) 12/01/2021 (Originally 9/1/2020)    A1C test (Diabetic or Prediabetic)  01/17/2021    Breast cancer screen  08/08/2021    Colon cancer screen colonoscopy  07/01/2024    DTaP/Tdap/Td vaccine (3 - Td) 01/28/2030    Hepatitis C screen  Completed    HIV screen  Completed    Hepatitis A vaccine  Aged Out    Hib vaccine  Aged Out    Meningococcal (ACWY) vaccine  Aged Out       Hemoglobin A1C (%)   Date Value   01/17/2020 6.8   08/26/2019 6.5   02/25/2019 6.6             ( goal A1C is < 7)   Microalb/Crt.  Ratio (mcg/mg creat)   Date Value   08/26/2015 12     LDL Cholesterol (mg/dL)   Date Value   02/27/2019 100   03/20/2017 98       (goal LDL is <100)   AST (U/L)   Date Value   02/27/2019 20     ALT (U/L)   Date Value   02/27/2019 20     BUN (mg/dL)   Date Value   04/04/2020 13     BP Readings from Last 3 Encounters:   04/04/20 133/85   01/28/20 (!) 165/85   01/17/20 110/70          (goal 120/80)    All Future Testing planned in [facility]  Lab Frequency Next Occurrence               Patient Active Problem List:     Metatarsalgia of both feet     Diabetes mellitus ([address]     Mixed hyperlipidemia     Abnormal cervical Papanicolaou smear

## 2020-05-06 LAB — DIABETIC RETINOPATHY: NEGATIVE

## 2020-06-19 ENCOUNTER — HOSPITAL ENCOUNTER (EMERGENCY)
Age: 55
Discharge: HOME | End: 2020-06-19
Attending: EMERGENCY MEDICINE
Payer: COMMERCIAL

## 2020-06-19 VITALS
TEMPERATURE: 97.9 F | HEART RATE: 83 BPM | WEIGHT: 188 LBS | DIASTOLIC BLOOD PRESSURE: 81 MMHG | OXYGEN SATURATION: 99 % | HEIGHT: 67 IN | SYSTOLIC BLOOD PRESSURE: 151 MMHG | RESPIRATION RATE: 18 BRPM | BODY MASS INDEX: 29.51 KG/M2

## 2020-06-19 LAB
ABSOLUTE EOS #: 0.47 K/UL (ref 0–0.44)
ABSOLUTE IMMATURE GRANULOCYTE: 0.04 K/UL (ref 0–0.3)
ABSOLUTE LYMPH #: 3.63 K/UL (ref 1.1–3.7)
ABSOLUTE MONO #: 0.81 K/UL (ref 0.1–1.2)
ANION GAP SERPL CALCULATED.3IONS-SCNC: 15 MMOL/L (ref 9–17)
BASOPHILS # BLD: 1 % (ref 0–2)
BASOPHILS ABSOLUTE: 0.08 K/UL (ref 0–0.2)
BUN BLDV-MCNC: 15 MG/DL (ref 6–20)
BUN/CREAT BLD: 17 (ref 9–20)
CALCIUM SERPL-MCNC: 9.5 MG/DL (ref 8.6–10.4)
CHLORIDE BLD-SCNC: 100 MMOL/L (ref 98–107)
CO2: 23 MMOL/L (ref 20–31)
CREAT SERPL-MCNC: 0.88 MG/DL (ref 0.5–0.9)
DIFFERENTIAL TYPE: ABNORMAL
EOSINOPHILS RELATIVE PERCENT: 4 % (ref 1–4)
GFR AFRICAN AMERICAN: >60 ML/MIN
GFR NON-AFRICAN AMERICAN: >60 ML/MIN
GFR SERPL CREATININE-BSD FRML MDRD: ABNORMAL ML/MIN/{1.73_M2}
GFR SERPL CREATININE-BSD FRML MDRD: ABNORMAL ML/MIN/{1.73_M2}
GLUCOSE BLD-MCNC: 228 MG/DL (ref 70–99)
HCT VFR BLD CALC: 35.5 % (ref 36.3–47.1)
HEMOGLOBIN: 11.4 G/DL (ref 11.9–15.1)
IMMATURE GRANULOCYTES: 0 %
LYMPHOCYTES # BLD: 33 % (ref 24–43)
MCH RBC QN AUTO: 31.1 PG (ref 25.2–33.5)
MCHC RBC AUTO-ENTMCNC: 32.1 G/DL (ref 28.4–34.8)
MCV RBC AUTO: 96.7 FL (ref 82.6–102.9)
MONOCYTES # BLD: 7 % (ref 3–12)
NRBC AUTOMATED: 0 PER 100 WBC
PDW BLD-RTO: 12.5 % (ref 11.8–14.4)
PLATELET # BLD: 310 K/UL (ref 138–453)
PLATELET ESTIMATE: ABNORMAL
PMV BLD AUTO: 9.8 FL (ref 8.1–13.5)
POTASSIUM SERPL-SCNC: 4 MMOL/L (ref 3.7–5.3)
RBC # BLD: 3.67 M/UL (ref 3.95–5.11)
RBC # BLD: ABNORMAL 10*6/UL
SEG NEUTROPHILS: 55 % (ref 36–65)
SEGMENTED NEUTROPHILS ABSOLUTE COUNT: 5.97 K/UL (ref 1.5–8.1)
SODIUM BLD-SCNC: 138 MMOL/L (ref 135–144)
WBC # BLD: 11 K/UL (ref 3.5–11.3)
WBC # BLD: ABNORMAL 10*3/UL

## 2020-06-19 PROCEDURE — 99285 EMERGENCY DEPT VISIT HI MDM: CPT

## 2020-06-19 PROCEDURE — 93005 ELECTROCARDIOGRAM TRACING: CPT | Performed by: EMERGENCY MEDICINE

## 2020-06-19 PROCEDURE — 80048 BASIC METABOLIC PNL TOTAL CA: CPT

## 2020-06-19 PROCEDURE — 85025 COMPLETE CBC W/AUTO DIFF WBC: CPT

## 2020-06-19 ASSESSMENT — ENCOUNTER SYMPTOMS
ABDOMINAL PAIN: 0
EYE REDNESS: 0
DIARRHEA: 0
COUGH: 0
FACIAL SWELLING: 0
CONSTIPATION: 0
VOMITING: 0
EYE DISCHARGE: 0
SHORTNESS OF BREATH: 0
COLOR CHANGE: 0

## 2020-06-20 LAB
EKG ATRIAL RATE: 86 BPM
EKG P AXIS: 64 DEGREES
EKG P-R INTERVAL: 180 MS
EKG Q-T INTERVAL: 356 MS
EKG QRS DURATION: 84 MS
EKG QTC CALCULATION (BAZETT): 426 MS
EKG R AXIS: 12 DEGREES
EKG T AXIS: 62 DEGREES
EKG VENTRICULAR RATE: 86 BPM

## 2020-06-20 PROCEDURE — 93010 ELECTROCARDIOGRAM REPORT: CPT | Performed by: INTERNAL MEDICINE

## 2020-06-20 NOTE — ED PROVIDER NOTES
[address] [place] [address] ED  EMERGENCY DEPARTMENT ENCOUNTER      Pt Name: Donna Plummer  MRN: [id number]  [place] 1965  Date of evaluation: 2020  Provider: Loraine Love MD    CHIEF COMPLAINT       Chief Complaint   Patient presents with    Palpitations     denies cp denies chest pressure. HISTORY OF PRESENT ILLNESS  (Location/Symptom, Timing/Onset, Context/Setting, Quality, Duration, Modifying Factors, Severity.)   Donna Plummer is a 62 y.o. female who presents to the emergency department for feeling that her heart is skipping a beat. She states is been happening about 10 times an hour for the last few hours. This happened previously and she had been on metoprolol. She was taken off of it after a Holter monitor was negative. She was seen by cardiologist.  She does not have chest pain or fever or cough. She describes feeling like her heart skips a beat and then it starts beating again normally. Nursing Notes were reviewed. ALLERGIES     Sulfa antibiotics    CURRENT MEDICATIONS       Previous Medications    ATORVASTATIN (LIPITOR) 40 MG TABLET    Take 1 tablet by mouth daily    IBUPROFEN (ADVIL;MOTRIN) 800 MG TABLET    Take 1 tablet by mouth 3 times daily as needed for Pain    MAGNESIUM OXIDE (MAGOX 400) 400 (241.3 MG) MG TABS TABLET    Take 1 tablet by mouth daily    METFORMIN (GLUCOPHAGE) 1000 MG TABLET    take 1 tablet by mouth twice a day with food    METOPROLOL SUCCINATE (TOPROL XL) 25 MG EXTENDED RELEASE TABLET    Take 1 tablet by mouth nightly    MULTIPLE VITAMINS-MINERALS (MULTIVITAMIN PO)    Take  by mouth.        PAST MEDICAL HISTORY         Diagnosis Date    Diabetes mellitus ([facility] [address]        SURGICAL HISTORY           Procedure Laterality Date    BREAST SURGERY Bilateral     reduction    BUNIONECTOMY Bilateral     COLONOSCOPY  14         FAMILY HISTORY           Problem Relation Age of Onset    Stroke Mother      Family Status   Relation Name Status    Mother   Abdominal:      General: There is no distension. Palpations: Abdomen is soft. Tenderness: There is no abdominal tenderness. Musculoskeletal: Normal range of motion. Lymphadenopathy:      Cervical: No cervical adenopathy. Skin:     General: Skin is warm and dry. Findings: No erythema or rash. Neurological:      Mental Status: She is alert and oriented to person, place, and time. Psychiatric:         Behavior: Behavior normal.             DIAGNOSTIC RESULTS     EKG: All EKG's are interpreted by the Emergency Department Physician who either signs or Co-signs this chart in the absence of a cardiologist.    EKG on my interpretation shows normal sinus rhythm    RADIOLOGY:   Non-plain film images such as CT, Ultrasound and MRI are read by the radiologist. Plain radiographic images are visualized and preliminarily interpreted by the emergency physician with the below findings:    Interpretation per the Radiologist below, if available at the time of this note:        LABS:  Labs Reviewed   CBC WITH AUTO DIFFERENTIAL - Abnormal; Notable for the following components:       Result Value    RBC 3.67 (*)     Hemoglobin 11.4 (*)     Hematocrit 35.5 (*)     Absolute Eos # 0.47 (*)     All other components within normal limits   BASIC METABOLIC PANEL - Abnormal; Notable for the following components:    Glucose 228 (*)     All other components within normal limits       All other labs were within normal range or not returned as of this dictation. EMERGENCY DEPARTMENT COURSE and DIFFERENTIAL DIAGNOSIS/MDM:   Vitals:    Vitals:    06/19/20 2130   BP: (!) 151/81   Pulse: 86   Resp: 16   Temp: 97.9 °F (36.6 °C)   TempSrc: Oral   SpO2: 99%   Weight: 188 lb (85.3 kg)   Height: 5' 7\" (1.702 m)       No orders of the defined types were placed in this encounter. Medical Decision Making: The patient had occasional PVC here. She is reassured and is discharged home.   She will follow-up with her cardiologist if

## 2020-08-03 RX ORDER — ATORVASTATIN CALCIUM 40 MG/1
TABLET, FILM COATED ORAL
Qty: 30 TABLET | Refills: 0 | Status: SHIPPED | OUTPATIENT
Start: 2020-08-03 | End: 2020-09-15 | Stop reason: SDUPTHER

## 2020-08-26 ENCOUNTER — HOSPITAL ENCOUNTER (OUTPATIENT)
Dept: MAMMOGRAPHY | Age: 55
Discharge: HOME | End: 2020-08-28
Payer: COMMERCIAL

## 2020-08-26 PROCEDURE — 77063 BREAST TOMOSYNTHESIS BI: CPT

## 2020-09-15 ENCOUNTER — OFFICE VISIT (OUTPATIENT)
Dept: FAMILY MEDICINE CLINIC | Age: 55
End: 2020-09-15
Payer: COMMERCIAL

## 2020-09-15 VITALS
OXYGEN SATURATION: 98 % | WEIGHT: 196.2 LBS | DIASTOLIC BLOOD PRESSURE: 74 MMHG | HEART RATE: 86 BPM | SYSTOLIC BLOOD PRESSURE: 130 MMHG | TEMPERATURE: 97.1 F | BODY MASS INDEX: 30.73 KG/M2

## 2020-09-15 LAB
CREATININE URINE POCT: 100
HBA1C MFR BLD: 7.6 %
MICROALBUMIN/CREAT 24H UR: 30 MG/G{CREAT}
MICROALBUMIN/CREAT UR-RTO: <30

## 2020-09-15 PROCEDURE — 90471 IMMUNIZATION ADMIN: CPT | Performed by: NURSE PRACTITIONER

## 2020-09-15 PROCEDURE — 3051F HG A1C>EQUAL 7.0%<8.0%: CPT | Performed by: NURSE PRACTITIONER

## 2020-09-15 PROCEDURE — 83036 HEMOGLOBIN GLYCOSYLATED A1C: CPT | Performed by: NURSE PRACTITIONER

## 2020-09-15 PROCEDURE — 82044 UR ALBUMIN SEMIQUANTITATIVE: CPT | Performed by: NURSE PRACTITIONER

## 2020-09-15 PROCEDURE — 90688 IIV4 VACCINE SPLT 0.5 ML IM: CPT | Performed by: NURSE PRACTITIONER

## 2020-09-15 PROCEDURE — 99214 OFFICE O/P EST MOD 30 MIN: CPT | Performed by: NURSE PRACTITIONER

## 2020-09-15 RX ORDER — ATORVASTATIN CALCIUM 40 MG/1
TABLET, FILM COATED ORAL
Qty: 90 TABLET | Refills: 1 | Status: SHIPPED | OUTPATIENT
Start: 2020-09-15 | End: 2020-12-16 | Stop reason: SDUPTHER

## 2020-09-15 NOTE — PATIENT INSTRUCTIONS
Patient Education        dapagliflozin  Pronunciation:  DAP a gli FLOE valnetinen  Brand:  Mauricio Meyer  What is the most important information I should know about dapagliflozin? You should not use dapagliflozin if you have bladder cancer, diabetic ketoacidosis, severe kidney disease, or if you are on dialysis. Taking dapagliflozin can make you dehydrated, which could cause you to feel weak or dizzy (especially when you stand up). Dapagliflozin can cause serious infections in the penis or vagina. Get medical help right away if you have burning, itching, odor, discharge, pain, tenderness, redness or swelling of the genital or rectal area, fever, or if you don't feel well. What is dapagliflozin? Dapagliflozin is an oral diabetes medicine that helps control blood sugar levels. Dapagliflozin works by helping the kidneys get rid of glucose from your bloodstream.  Dapagliflozin is used together with diet and exercise to improve blood sugar control in adults with type 2 diabetes mellitus. Dapagliflozin is not for treating type 1 diabetes. Dapagliflozin may also be used for purposes not listed in this medication guide. What should I discuss with my healthcare provider before taking dapagliflozin? You should not use dapagliflozin if you are allergic to it, or if you have:  · active bladder cancer;  · severe kidney disease (or if you are on dialysis); or  · diabetic ketoacidosis (call your doctor for treatment). Tell your doctor if you have ever had:  · liver or kidney disease;  · bladder cancer;  · bladder infections or other urination problems;  · low blood pressure;  · problems with your pancreas, including surgery;  · if you drink alcohol often; or  · if you are on a low salt diet. Follow your doctor's instructions about using this medicine if you are pregnant. Blood sugar control is very important during pregnancy, and your dose needs may be different during each trimester.   You should not use dapagliflozin during the second or third trimester of pregnancy. You should not breast-feed while using this medicine. Dapagliflozin is not approved for use by anyone younger than 25years old. How should I take dapagliflozin? Follow all directions on your prescription label and read all medication guides or instruction sheets. Your doctor may occasionally change your dose. Use the medicine exactly as directed. You may take dapagliflozin with or without food. Call your doctor if you are sick with vomiting or diarrhea, if you consume less food or fluid than usual, or if you are sweating more than usual.  Your blood sugar will need to be checked often, and you may also need to test the level of ketones your urine. Dapagliflozin can cause life-threatening ketoacidosis (too much acid in the blood). Even if your blood sugar is normal, contact your doctor if a urine test shows that you have ketones in the urine. Low blood sugar (hypoglycemia) can happen to everyone who has diabetes. Symptoms include headache, hunger, sweating, irritability, dizziness, nausea, fast heart rate, and feeling anxious or shaky. To quickly treat low blood sugar, always keep a fast-acting source of sugar with you such as fruit juice, hard candy, crackers, raisins, or non-diet soda. Your doctor can prescribe a glucagon emergency injection kit to use in case you have severe hypoglycemia and cannot eat or drink. Be sure your family and close friends know how to give you this injection in an emergency. Also watch for signs of high blood sugar (hyperglycemia) such as increased thirst or urination, blurred vision, headache, and tiredness. Blood sugar levels can be affected by stress, illness, surgery, exercise, alcohol use, or skipping meals. Ask your doctor before changing your dose or medication schedule. This medicine can affect the results of certain medical tests. Tell any doctor who treats you that you are using dapagliflozin.   Dapagliflozin is only part of a for medical advice about side effects. You may report side effects to FDA at 0-067-FDA-6212. What other drugs will affect dapagliflozin? Tell your doctor about all your other medicines, especially:  · insulin or other oral diabetes medicines; or  · a diuretic or \"water pill. \"  This list is not complete. Other drugs may affect dapagliflozin, including prescription and over-the-counter medicines, vitamins, and herbal products. Not all possible drug interactions are listed here. Where can I get more information? Your pharmacist can provide more information about dapagliflozin. Remember, keep this and all other medicines out of the reach of children, never share your medicines with others, and use this medication only for the indication prescribed. Every effort has been made to ensure that the information provided by [address] is accurate, up-to-date, and complete, but no guarantee is made to that effect. Drug information contained herein may be time sensitive. [facility] [employer] information has been compiled for use by healthcare practitioners and consumers in the [place] and therefore [employer] does not warrant that uses outside of the [place] are appropriate, unless specifically indicated otherwise. [facility]'s drug information does not endorse drugs, diagnose patients or recommend therapy. [employer] drug information is an informational resource designed to assist licensed healthcare practitioners in caring for their patients and/or to serve consumers viewing this service as a supplement to, and not a substitute for, the expertise, skill, knowledge and judgment of healthcare practitioners. The absence of a warning for a given drug or drug combination in no way should be construed to indicate that the drug or drug combination is safe, effective or appropriate for any given patient.  [employer] does not assume any responsibility for any aspect of healthcare administered with the aid of information [employer] provides. The information contained herein is not intended to cover all possible uses, directions, precautions, warnings, drug interactions, allergic reactions, or adverse effects. If you have questions about the drugs you are taking, check with your doctor, nurse or pharmacist.  Copyright 6142-5985 [address]. Version: 5.01. Revision date: 8/30/2018. Care instructions adapted under license by [facility] ([facility]). If you have questions about a medical condition or this instruction, always ask your healthcare professional. [place] [address] any warranty or liability for your use of this information.

## 2020-09-15 NOTE — PROGRESS NOTES
[address] [place]  [phone]    Hermelinda Gomez is a 60 y.o. female who presents today for her  medical conditions/complaints as noted below. Hermelinda Gomez is c/o of Medication Refill and Diabetes  . HPI:     HPI  Diabetes Mellitus Type 2: Current symptoms/problems include none. Medication compliance:  compliant all of the time  Diabetic diet compliance:  compliant most of the time,  Weight trend: stable  Current exercise: exercise varies  Barriers: none    Home blood sugar records: trend: fluctuating a bit  Any episodes of hypoglycemia? no  Eye exam current (within one year): yes   reports that she has never smoked. She has never used smokeless tobacco.   Daily Aspirin? No: nsaid use  She was diet and metformin controlled when she first became a diabetic- her initial a1c was 11. Has seen a podiatrist in the past.     Lab Results   Component Value Date    LABA1C 7.6 09/15/2020    LABA1C 6.8 01/17/2020    LABA1C 6.5 08/26/2019     Lab Results   Component Value Date    LABMICR 12 08/26/2015    CREATININE 0.88 06/19/2020     Lab Results   Component Value Date    ALT 20 02/27/2019    AST 20 02/27/2019     Lab Results   Component Value Date    CHOL 189 02/27/2019    TRIG 66 02/27/2019    HDL 76 02/27/2019    LDLDIRECT 214 (H) 01/15/2014                                            Sodium (mmol/L)   Date Value   06/19/2020 138    BUN (mg/dL)   Date Value   06/19/2020 15    Glucose (mg/dL)   Date Value   06/19/2020 228 (H)      Potassium (mmol/L)   Date Value   06/19/2020 4.0    CREATININE (mg/dL)   Date Value   06/19/2020 0.88         Wt Readings from Last 3 Encounters:   09/15/20 196 lb 3.2 oz (89 kg)   06/19/20 188 lb (85.3 kg)   04/04/20 194 lb (88 kg)     Nursing note reviewed and discussed with patient.     Patient's medications, allergies, past medical, surgical, social and family histories were reviewed and updated asappropriate. Current Outpatient Medications   Medication Sig Dispense Refill    atorvastatin (LIPITOR) 40 MG tablet take 1 tablet by mouth once daily 90 tablet 1    metFORMIN (GLUCOPHAGE) 1000 MG tablet take 1 tablet by mouth twice a day with food 180 tablet 1    ibuprofen (ADVIL;MOTRIN) 800 MG tablet Take 1 tablet by mouth 3 times daily as needed for Pain 270 tablet 1    empagliflozin (JARDIANCE) 10 MG tablet Take 1 tablet by mouth daily 90 tablet 1    Multiple Vitamins-Minerals (MULTIVITAMIN PO) Take  by mouth. No current facility-administered medications for this visit. Past Medical History:   Diagnosis Date    Diabetes mellitus ([facility] [address]       Past Surgical History:   Procedure Laterality Date    BREAST SURGERY Bilateral     reduction    BUNIONECTOMY Bilateral     COLONOSCOPY  7/1/14     Family History   Problem Relation Age of Onset    Stroke Mother      Social History     Tobacco Use    Smoking status: Never Smoker    Smokeless tobacco: Never Used   Substance Use Topics    Alcohol use: Yes     Comment: occassional      Allergies   Allergen Reactions    Sulfa Antibiotics Hives       Subjective:      Review of Systems   Constitutional: Negative for chills and fever. Respiratory: Negative for cough and shortness of breath. Cardiovascular: Negative for chest pain, palpitations and leg swelling. Other pertinent ROS in HPI  Objective:     /74 (Site: Left Upper Arm, Position: Sitting, Cuff Size: Large Adult)   Pulse 86   Temp 97.1 °F (36.2 °C)   Wt 196 lb 3.2 oz (89 kg)   LMP 05/16/2016 (Approximate)   SpO2 98%   BMI 30.73 kg/m²    Physical Exam  Constitutional:       Appearance: She is well-developed. HENT:      Right Ear: External ear normal.      Left Ear: External ear normal.      Nose: Nose normal.   Neck:      Musculoskeletal: Full passive range of motion without pain and normal range of motion.    Cardiovascular:      Rate and Rhythm: Normal rate and regular rhythm. Heart sounds: Normal heart sounds, S1 normal and S2 normal.   Pulmonary:      Effort: Pulmonary effort is normal. No respiratory distress. Breath sounds: Normal breath sounds. Musculoskeletal: Normal range of motion. General: No deformity. Skin:     General: Skin is warm and dry. Neurological:      Mental Status: She is alert and oriented to person, place, and time. Assessment/PLAN   1. Type 2 diabetes mellitus without complication, without long-term current use of insulin (HCC)  Continue to make dietary and exercise changes. - POCT glycosylated hemoglobin (Hb A1C)  - POCT microalbumin  - atorvastatin (LIPITOR) 40 MG tablet; take 1 tablet by mouth once daily  Dispense: 90 tablet; Refill: 1  - metFORMIN (GLUCOPHAGE) 1000 MG tablet; take 1 tablet by mouth twice a day with food  Dispense: 180 tablet; Refill: 1  - External Referral To Podiatry    2. Need for influenza vaccination    - INFLUENZA, QUADV, 3 YRS AND OLDER, IM, MDV, 0.5ML (Lucio Forte)      RTO if symptoms worsen or fail to improve  Pt agreeable with plan      Patient given educational materials - see patientinstructions. Discussed use, benefit, and side effects of prescribed medications. All patient questions answered. Pt voiced understanding. Reviewed health maintenance. Instructed to continue current medications, diet andexercise. 1.  Kimber received counseling on the following healthy behaviors: nutrition, exercise and medication adherence  2. Patient given educationalmaterials when available - see patient instructions when applicable  3. Discussed use, benefit, and side effects of prescribed medications. Barriersto medication compliance addressed. All patient questions answered. Pt voiced understanding. 4.  Reviewed prior labs and health maintenance when applicable. 5.  Continue current medications, diet and exercise.     CompletedRefills   Requested Prescriptions     Signed Prescriptions Disp Refills    atorvastatin (LIPITOR) 40 MG tablet 90 tablet 1     Sig: take 1 tablet by mouth once daily    metFORMIN (GLUCOPHAGE) 1000 MG tablet 180 tablet 1     Sig: take 1 tablet by mouth twice a day with food       This note was transcribed using dictation with Dragon services. Efforts were made to correct any errors but some words may be misinterpreted.     Electronically signed by Radha Calderón CNP on 9/25/2020 at 11:42 AM

## 2020-09-16 ENCOUNTER — TELEPHONE (OUTPATIENT)
Dept: FAMILY MEDICINE CLINIC | Age: 55
End: 2020-09-16

## 2020-09-16 NOTE — TELEPHONE ENCOUNTER
Patient called and said the [employer] will cost over $250 even with coupon.  Please advise other options

## 2020-09-21 RX ORDER — EMPAGLIFLOZIN 10 MG/1
1 TABLET, FILM COATED ORAL DAILY
Qty: 90 TABLET | Refills: 1 | Status: SHIPPED | OUTPATIENT
Start: 2020-09-21 | End: 2021-07-13 | Stop reason: CLARIF

## 2020-09-25 ASSESSMENT — ENCOUNTER SYMPTOMS
SHORTNESS OF BREATH: 0
COUGH: 0

## 2020-09-29 ENCOUNTER — HOSPITAL ENCOUNTER (OUTPATIENT)
Age: 55
Discharge: HOME | End: 2020-10-01
Payer: COMMERCIAL

## 2020-09-29 ENCOUNTER — HOSPITAL ENCOUNTER (OUTPATIENT)
Dept: GENERAL RADIOLOGY | Age: 55
Discharge: HOME | End: 2020-10-01
Payer: COMMERCIAL

## 2020-09-29 PROCEDURE — 73630 X-RAY EXAM OF FOOT: CPT

## 2020-12-16 ENCOUNTER — TELEMEDICINE (OUTPATIENT)
Dept: FAMILY MEDICINE CLINIC | Age: 55
End: 2020-12-16
Payer: COMMERCIAL

## 2020-12-16 ENCOUNTER — HOSPITAL ENCOUNTER (OUTPATIENT)
Age: 55
Setting detail: SPECIMEN
Discharge: HOME | End: 2020-12-16
Payer: COMMERCIAL

## 2020-12-16 LAB
ESTIMATED AVERAGE GLUCOSE: 148 MG/DL
HBA1C MFR BLD: 6.8 % (ref 4–6)

## 2020-12-16 PROCEDURE — 3051F HG A1C>EQUAL 7.0%<8.0%: CPT | Performed by: NURSE PRACTITIONER

## 2020-12-16 PROCEDURE — 99213 OFFICE O/P EST LOW 20 MIN: CPT | Performed by: NURSE PRACTITIONER

## 2020-12-16 RX ORDER — ATORVASTATIN CALCIUM 40 MG/1
TABLET, FILM COATED ORAL
Qty: 30 TABLET | Refills: 5 | Status: SHIPPED | OUTPATIENT
Start: 2020-12-16 | End: 2021-07-13 | Stop reason: SDUPTHER

## 2020-12-16 ASSESSMENT — ENCOUNTER SYMPTOMS
SHORTNESS OF BREATH: 0
COUGH: 0

## 2020-12-16 NOTE — PROGRESS NOTES
VISIT LOCATION: Home    TELEHEALTH EVALUATION -- Audio/Visual (During Y-51 public health emergency)    Due to COVID 23 outbreak, patient's office visit was converted to a virtual visit. Patient was contacted and agreed to proceed with a virtual visit via [address] Visit  The risks and benefits of converting to a virtual visit were discussed in light of the current infectious disease epidemic. Patient also understood that insurance coverage and co-pays are up to their individual insurance plans. Oli Vieira (:  1965) has requested an audio/video evaluation for the following concern(s):    Chief Complaint:   HPI:    Diabetes Mellitus Type 2: Current symptoms/problems include none. Medication compliance:  compliant most of the time  Diabetic diet compliance:  compliant all of the time,  Weight trend: stable  Current exercise: walks multiple  time(s) per week  Barriers: none    Home blood sugar records: trend: stable  Any episodes of hypoglycemia? no  Eye exam current (within one year): yes   reports that she has never smoked. She has never used smokeless tobacco.   Daily Aspirin? No: prn nsaids    Lab Results   Component Value Date    LABA1C 7.6 09/15/2020    LABA1C 6.8 2020    LABA1C 6.5 2019     Lab Results   Component Value Date    LABMICR 12 2015    CREATININE 0.88 2020     Lab Results   Component Value Date    ALT 20 2019    AST 20 2019     Lab Results   Component Value Date    CHOL 189 2019    TRIG 66 2019    HDL 76 2019    LDLDIRECT 214 (H) 01/15/2014          Review of Systems   Constitutional: Negative for chills and fever. Respiratory: Negative for cough and shortness of breath. Cardiovascular: Negative for chest pain, palpitations and leg swelling. Prior to Visit Medications    Medication Sig Taking?  Authorizing Provider atorvastatin (LIPITOR) 40 MG tablet take 1 tablet by mouth once daily Yes TERA Elliott CNP   metFORMIN (GLUCOPHAGE) 1000 MG tablet take 1 tablet by mouth twice a day with food Yes TERA Elliott CNP   ibuprofen (ADVIL;MOTRIN) 800 MG tablet Take 1 tablet by mouth 3 times daily as needed for Pain Yes Monisha Meyers MD   Multiple Vitamins-Minerals (MULTIVITAMIN PO) Take  by mouth. Yes Historical Provider, MD   empagliflozin (JARDIANCE) 10 MG tablet Take 1 tablet by mouth daily  Patient not taking: Reported on 12/16/2020  TERA Elliott CNP     Social- none    Past Medical History:   Diagnosis Date    Diabetes mellitus ([facility] [address]    ,   Past Surgical History:   Procedure Laterality Date    BREAST SURGERY Bilateral     reduction    BUNIONECTOMY Bilateral     COLONOSCOPY  7/1/14             [] OTHER:    Constitutional: [x] Appears well-developed and well-nourished [] No apparent distress                            [] Abnormal-   Mental status  [x] Alert and awake  [x] Oriented to person/place/time [x]Able to follow commands       Eyes:  EOM    [x]  Normal  [] Abnormal-  Sclera  [x]  Normal  [] Abnormal -         Discharge [x]  None visible  [] Abnormal -     HENT:   [x] Normocephalic, atraumatic.   [] Abnormal   [] Mouth/Throat: Mucous membranes are moist.      External Ears [x] Normal  [] Abnormal-      Neck: [x] No visualized mass      Pulmonary/Chest: [x] Respiratory effort normal.  [] No visualized signs of difficulty breathing or respiratory distress        [] Abnormal-      Musculoskeletal:   [] Normal gait with no signs of ataxia         [x] Normal range of motion of neck        [] Abnormal-   [] Motor grossly intact in visible upper extremities    [] Motor grossly intact in visible lower extremities        Neurological:        [x] No Facial Asymmetry (Cranial nerve 7 motor function) (limited exam to video visit)                       [x] No gaze palsy [] Abnormal-         Skin:                     [x] No significant exanthematous lesions or discoloration noted on facial skin         [] Abnormal-                                  Psychiatric:           [x] Normal Affect [] No Hallucinations        [] Abnormal- [] Normal Mood  [] Anxious appearing    [] Depressed appearing  [] Confused appearing      Due to this being a TeleHealth encounter, evaluation of the following organ systems is limited: Vitals/Constitutional/EENT/Resp/CV/GI//MS/Neuro/Skin/Heme-Lymph-Imm. ASSESSMENT/PLAN:  Encounter Diagnosis   Name Primary?  Type 2 diabetes mellitus without complication, without long-term current use of insulin (HCC) Yes       Orders Placed This Encounter   Medications    atorvastatin (LIPITOR) 40 MG tablet     Sig: take 1 tablet by mouth once daily     Dispense:  30 tablet     Refill:  5    metFORMIN (GLUCOPHAGE) 1000 MG tablet     Sig: take 1 tablet by mouth twice a day with food     Dispense:  60 tablet     Refill:  5     Pt was unable to afford sglt2 with insurance, thinks her a1c will be improved with her exercise, will get checked. No follow-ups on file. The time that was spent with the family/patient addressing care on this video call and chart review was 15 minutes. An  electronic signature was used to authenticate this note. --TERA Marvin - CNP on 12/16/2020 at 10:44 AM        Pursuant to the emergency declaration under the [facility] waiver authority and the [employer] Act, this Virtual  Visit was conducted, with patient's consent, to reduce the patient's risk of exposure to COVID-19 and provide continuity of care for an established patient. Services were provided through a telephone discussion virtually to substitute for in-person clinic visit.

## 2021-07-13 ENCOUNTER — TELEMEDICINE (OUTPATIENT)
Dept: FAMILY MEDICINE CLINIC | Age: 56
End: 2021-07-13
Payer: COMMERCIAL

## 2021-07-13 ENCOUNTER — HOSPITAL ENCOUNTER (OUTPATIENT)
Age: 56
Setting detail: SPECIMEN
Discharge: HOME | End: 2021-07-13
Payer: COMMERCIAL

## 2021-07-13 DIAGNOSIS — Z13.29 SCREENING FOR THYROID DISORDER: ICD-10-CM

## 2021-07-13 DIAGNOSIS — E11.9 TYPE 2 DIABETES MELLITUS WITHOUT COMPLICATION, WITHOUT LONG-TERM CURRENT USE OF INSULIN (HCC): Primary | ICD-10-CM

## 2021-07-13 DIAGNOSIS — E11.9 TYPE 2 DIABETES MELLITUS WITHOUT COMPLICATION, WITHOUT LONG-TERM CURRENT USE OF INSULIN (HCC): ICD-10-CM

## 2021-07-13 LAB
ALBUMIN SERPL-MCNC: 4.2 G/DL (ref 3.5–5.2)
ALBUMIN/GLOBULIN RATIO: 1.4 (ref 1–2.5)
ALP BLD-CCNC: 96 U/L (ref 35–104)
ALT SERPL-CCNC: 19 U/L (ref 5–33)
ANION GAP SERPL CALCULATED.3IONS-SCNC: 15 MMOL/L (ref 9–17)
AST SERPL-CCNC: 17 U/L
BILIRUB SERPL-MCNC: 0.55 MG/DL (ref 0.3–1.2)
BUN BLDV-MCNC: 14 MG/DL (ref 6–20)
BUN/CREAT BLD: ABNORMAL (ref 9–20)
CALCIUM SERPL-MCNC: 9.1 MG/DL (ref 8.6–10.4)
CHLORIDE BLD-SCNC: 101 MMOL/L (ref 98–107)
CHOLESTEROL/HDL RATIO: 2.9
CHOLESTEROL: 206 MG/DL
CO2: 24 MMOL/L (ref 20–31)
CREAT SERPL-MCNC: 0.76 MG/DL (ref 0.5–0.9)
GFR AFRICAN AMERICAN: >60 ML/MIN
GFR NON-AFRICAN AMERICAN: >60 ML/MIN
GFR SERPL CREATININE-BSD FRML MDRD: ABNORMAL ML/MIN/{1.73_M2}
GFR SERPL CREATININE-BSD FRML MDRD: ABNORMAL ML/MIN/{1.73_M2}
GLUCOSE BLD-MCNC: 156 MG/DL (ref 70–99)
HBA1C MFR BLD: 8.1 %
HCT VFR BLD CALC: 40.1 % (ref 36.3–47.1)
HDLC SERPL-MCNC: 71 MG/DL
HEMOGLOBIN: 12.6 G/DL (ref 11.9–15.1)
LDL CHOLESTEROL: 108 MG/DL (ref 0–130)
MCH RBC QN AUTO: 30.6 PG (ref 25.2–33.5)
MCHC RBC AUTO-ENTMCNC: 31.4 G/DL (ref 28.4–34.8)
MCV RBC AUTO: 97.3 FL (ref 82.6–102.9)
NRBC AUTOMATED: 0 PER 100 WBC
PDW BLD-RTO: 12.7 % (ref 11.8–14.4)
PLATELET # BLD: 365 K/UL (ref 138–453)
PMV BLD AUTO: 10.4 FL (ref 8.1–13.5)
POTASSIUM SERPL-SCNC: 5 MMOL/L (ref 3.7–5.3)
RBC # BLD: 4.12 M/UL (ref 3.95–5.11)
SODIUM BLD-SCNC: 140 MMOL/L (ref 135–144)
TOTAL PROTEIN: 7.1 G/DL (ref 6.4–8.3)
TRIGL SERPL-MCNC: 136 MG/DL
TSH SERPL DL<=0.05 MIU/L-ACNC: 2.74 MIU/L (ref 0.3–5)
VLDLC SERPL CALC-MCNC: ABNORMAL MG/DL (ref 1–30)
WBC # BLD: 8.4 K/UL (ref 3.5–11.3)

## 2021-07-13 PROCEDURE — 99213 OFFICE O/P EST LOW 20 MIN: CPT | Performed by: NURSE PRACTITIONER

## 2021-07-13 PROCEDURE — 83036 HEMOGLOBIN GLYCOSYLATED A1C: CPT | Performed by: NURSE PRACTITIONER

## 2021-07-13 PROCEDURE — 3052F HG A1C>EQUAL 8.0%<EQUAL 9.0%: CPT | Performed by: NURSE PRACTITIONER

## 2021-07-13 RX ORDER — ATENOLOL 25 MG/1
TABLET ORAL
COMMUNITY
Start: 2021-06-12 | End: 2021-10-18 | Stop reason: ALTCHOICE

## 2021-07-13 RX ORDER — ATORVASTATIN CALCIUM 40 MG/1
TABLET, FILM COATED ORAL
Qty: 30 TABLET | Refills: 5 | Status: SHIPPED | OUTPATIENT
Start: 2021-07-13 | End: 2022-01-12

## 2021-07-13 RX ORDER — IBUPROFEN 800 MG/1
800 TABLET ORAL 3 TIMES DAILY PRN
Qty: 270 TABLET | Refills: 1 | Status: SHIPPED | OUTPATIENT
Start: 2021-07-13

## 2021-07-13 SDOH — ECONOMIC STABILITY: FOOD INSECURITY: WITHIN THE PAST 12 MONTHS, YOU WORRIED THAT YOUR FOOD WOULD RUN OUT BEFORE YOU GOT MONEY TO BUY MORE.: NEVER TRUE

## 2021-07-13 SDOH — ECONOMIC STABILITY: FOOD INSECURITY: WITHIN THE PAST 12 MONTHS, THE FOOD YOU BOUGHT JUST DIDN'T LAST AND YOU DIDN'T HAVE MONEY TO GET MORE.: NEVER TRUE

## 2021-07-13 ASSESSMENT — PATIENT HEALTH QUESTIONNAIRE - PHQ9
SUM OF ALL RESPONSES TO PHQ9 QUESTIONS 1 & 2: 0
SUM OF ALL RESPONSES TO PHQ QUESTIONS 1-9: 0
2. FEELING DOWN, DEPRESSED OR HOPELESS: 0
1. LITTLE INTEREST OR PLEASURE IN DOING THINGS: 0

## 2021-07-13 ASSESSMENT — ENCOUNTER SYMPTOMS
COUGH: 0
SHORTNESS OF BREATH: 0

## 2021-07-13 ASSESSMENT — SOCIAL DETERMINANTS OF HEALTH (SDOH): HOW HARD IS IT FOR YOU TO PAY FOR THE VERY BASICS LIKE FOOD, HOUSING, MEDICAL CARE, AND HEATING?: NOT HARD AT ALL

## 2021-07-13 NOTE — PROGRESS NOTES
VISIT LOCATION: Home    TELEHEALTH EVALUATION -- Audio/Visual (During BIZCL-02 public health emergency)    Due to COVID 23 outbreak, patient's office visit was converted to a virtual visit. Patient was contacted and agreed to proceed with a virtual visit via [address] Visit  The risks and benefits of converting to a virtual visit were discussed in light of the current infectious disease epidemic. Patient also understood that insurance coverage and co-pays are up to their individual insurance plans. Jd Moreno (:  1965) has requested an audio/video evaluation for the following concern(s):    Chief Complaint:   HPI:  Diabetes Mellitus Type 2: Current symptoms/problems include none. Medication compliance:  compliant all of the time  Diabetic diet compliance:  compliant all of the time,  Weight trend: increasing  Current exercise: no regular exercise  Barriers: none    Home blood sugar records: testing in the am and finding that numbers are high since starting atenolol  Any episodes of hypoglycemia? no  Eye exam current (within one year): yes   reports that she has never smoked. She has never used smokeless tobacco.   Daily Aspirin? No: on nsaids  Pt states sugars have been elevated since being started on atenolol- she has an apt with cardio within the next month and is going to see if they will switch her to carvedilol     Lab Results   Component Value Date    LABA1C 8.1 2021    LABA1C 6.8 (H) 2020    LABA1C 7.6 09/15/2020     Lab Results   Component Value Date    LABMICR 12 2015    CREATININE 0.88 2020     Lab Results   Component Value Date    ALT 20 2019    AST 20 2019     Lab Results   Component Value Date    CHOL 189 2019    TRIG 66 2019    HDL 76 2019    LDLDIRECT 214 (H) 01/15/2014        Review of Systems   Constitutional: Negative for chills and fever. Respiratory: Negative for cough and shortness of breath.     Cardiovascular: Negative for chest pain, palpitations and leg swelling. Prior to Visit Medications    Medication Sig Taking? Authorizing Provider   atenolol (TENORMIN) 25 MG tablet take 1 tablet by mouth once daily Yes Historical Provider, MD   atorvastatin (LIPITOR) 40 MG tablet take 1 tablet by mouth once daily Yes TERA Cast CNP   metFORMIN (GLUCOPHAGE) 1000 MG tablet take 1 tablet by mouth twice a day with food Yes TERA Cast CNP   ibuprofen (ADVIL;MOTRIN) 800 MG tablet Take 1 tablet by mouth 3 times daily as needed for Pain Yes TERA Cast CNP   Multiple Vitamins-Minerals (MULTIVITAMIN PO) Take  by mouth. Yes Historical Provider, MD     Social- none    Past Medical History:   Diagnosis Date    Diabetes mellitus ([facility] [address]    ,   Past Surgical History:   Procedure Laterality Date    BREAST SURGERY Bilateral     reduction    BUNIONECTOMY Bilateral     COLONOSCOPY  7/1/14             [] OTHER:    Constitutional: [x] Appears well-developed and well-nourished [] No apparent distress                            [] Abnormal-   Mental status  [x] Alert and awake  [x] Oriented to person/place/time [x]Able to follow commands       Eyes:  EOM    [x]  Normal  [] Abnormal-  Sclera  [x]  Normal  [] Abnormal -         Discharge [x]  None visible  [] Abnormal -     HENT:   [x] Normocephalic, atraumatic.   [] Abnormal   [] Mouth/Throat: Mucous membranes are moist.      External Ears [x] Normal  [] Abnormal-      Neck: [x] No visualized mass      Pulmonary/Chest: [x] Respiratory effort normal.  [] No visualized signs of difficulty breathing or respiratory distress        [] Abnormal-      Musculoskeletal:   [] Normal gait with no signs of ataxia         [x] Normal range of motion of neck        [] Abnormal-   [] Motor grossly intact in visible upper extremities    [] Motor grossly intact in visible lower extremities        Neurological:        [x] No Facial Asymmetry (Cranial nerve 7 motor function) (limited exam to video visit)                       [x] No gaze palsy        [] Abnormal-         Skin:                     [x] No significant exanthematous lesions or discoloration noted on facial skin         [] Abnormal-                                  Psychiatric:           [x] Normal Affect [] No Hallucinations        [] Abnormal- [] Normal Mood  [] Anxious appearing    [] Depressed appearing  [] Confused appearing      Due to this being a TeleHealth encounter, evaluation of the following organ systems is limited: Vitals/Constitutional/EENT/Resp/CV/GI//MS/Neuro/Skin/Heme-Lymph-Imm. ASSESSMENT/PLAN:  Encounter Diagnoses   Name Primary?  Type 2 diabetes mellitus without complication, without long-term current use of insulin (HCC) Yes    Screening for thyroid disorder        Orders Placed This Encounter   Medications    atorvastatin (LIPITOR) 40 MG tablet     Sig: take 1 tablet by mouth once daily     Dispense:  30 tablet     Refill:  5    metFORMIN (GLUCOPHAGE) 1000 MG tablet     Sig: take 1 tablet by mouth twice a day with food     Dispense:  60 tablet     Refill:  5    ibuprofen (ADVIL;MOTRIN) 800 MG tablet     Sig: Take 1 tablet by mouth 3 times daily as needed for Pain     Dispense:  270 tablet     Refill:  1     Pt is hopeful that she can get cardiology to switch her atenolol to carvedilol and her sugars will normalize again. She will keep us notified, if sugars not normalizing will consider addition of med at that time. No follow-ups on file. An  electronic signature was used to authenticate this note.     --TERA Hylton - CNP on 7/13/2021 at 9:00 AM        Pursuant to the emergency declaration under the [facility], [facility] waiver authority and the [employer] Act, this Virtual  Visit was conducted, with patient's consent, to reduce the patient's risk of exposure to COVID-19 and provide continuity of care for an established patient. Services were provided through a telephone discussion virtually to substitute for in-person clinic visit.

## 2021-08-31 ENCOUNTER — HOSPITAL ENCOUNTER (OUTPATIENT)
Dept: GENERAL RADIOLOGY | Age: 56
Discharge: HOME | End: 2021-09-02
Payer: COMMERCIAL

## 2021-08-31 ENCOUNTER — HOSPITAL ENCOUNTER (OUTPATIENT)
Age: 56
Discharge: HOME | End: 2021-09-02
Payer: COMMERCIAL

## 2021-08-31 DIAGNOSIS — M79.672 PAIN IN LEFT FOOT: ICD-10-CM

## 2021-08-31 PROCEDURE — 73630 X-RAY EXAM OF FOOT: CPT

## 2021-10-18 ENCOUNTER — OFFICE VISIT (OUTPATIENT)
Dept: FAMILY MEDICINE CLINIC | Age: 56
End: 2021-10-18
Payer: COMMERCIAL

## 2021-10-18 ENCOUNTER — HOSPITAL ENCOUNTER (OUTPATIENT)
Age: 56
Setting detail: SPECIMEN
Discharge: HOME | End: 2021-10-18
Payer: COMMERCIAL

## 2021-10-18 VITALS
OXYGEN SATURATION: 99 % | WEIGHT: 194 LBS | BODY MASS INDEX: 30.45 KG/M2 | HEART RATE: 75 BPM | DIASTOLIC BLOOD PRESSURE: 70 MMHG | SYSTOLIC BLOOD PRESSURE: 130 MMHG | HEIGHT: 67 IN

## 2021-10-18 DIAGNOSIS — E11.9 TYPE 2 DIABETES MELLITUS WITHOUT COMPLICATION, WITHOUT LONG-TERM CURRENT USE OF INSULIN (HCC): Primary | ICD-10-CM

## 2021-10-18 DIAGNOSIS — Z12.31 ENCOUNTER FOR SCREENING MAMMOGRAM FOR BREAST CANCER: ICD-10-CM

## 2021-10-18 DIAGNOSIS — R82.998 LEUKOCYTES IN URINE: ICD-10-CM

## 2021-10-18 LAB
BILIRUBIN, POC: ABNORMAL
BLOOD URINE, POC: ABNORMAL
CLARITY, POC: CLEAR
COLOR, POC: ABNORMAL
CREATININE URINE POCT: 100
GLUCOSE URINE, POC: ABNORMAL
HBA1C MFR BLD: 8.3 %
KETONES, POC: ABNORMAL
LEUKOCYTE EST, POC: ABNORMAL
MICROALBUMIN/CREAT 24H UR: 10 MG/G{CREAT}
MICROALBUMIN/CREAT UR-RTO: <30
NITRITE, POC: ABNORMAL
PH, POC: 5
PROTEIN, POC: ABNORMAL
SPECIFIC GRAVITY, POC: 1.02
UROBILINOGEN, POC: 0.2

## 2021-10-18 PROCEDURE — 99213 OFFICE O/P EST LOW 20 MIN: CPT | Performed by: NURSE PRACTITIONER

## 2021-10-18 PROCEDURE — 81003 URINALYSIS AUTO W/O SCOPE: CPT | Performed by: NURSE PRACTITIONER

## 2021-10-18 PROCEDURE — 3052F HG A1C>EQUAL 8.0%<EQUAL 9.0%: CPT | Performed by: NURSE PRACTITIONER

## 2021-10-18 PROCEDURE — 83036 HEMOGLOBIN GLYCOSYLATED A1C: CPT | Performed by: NURSE PRACTITIONER

## 2021-10-18 PROCEDURE — 82044 UR ALBUMIN SEMIQUANTITATIVE: CPT | Performed by: NURSE PRACTITIONER

## 2021-10-18 RX ORDER — CARVEDILOL 3.12 MG/1
TABLET ORAL
COMMUNITY
Start: 2021-10-05

## 2021-10-18 ASSESSMENT — ENCOUNTER SYMPTOMS
COUGH: 0
SHORTNESS OF BREATH: 0

## 2021-10-18 NOTE — PROGRESS NOTES
Patient is present for 3 month f/u for DM  Patient states she checks her sugars in the morning; states they have been 130-160  States her podiatrist put her on a steroid recently

## 2021-10-18 NOTE — PROGRESS NOTES
[address] [place]  [phone]    Jn Knutson is a 60 y.o. female who presents today for her  medical conditions/complaints as noted below. Jn Knutson is c/o of 3 Month Follow-Up and Diabetes    HPI:     HPI     Not getting covid vaccine    Wt Readings from Last 3 Encounters:   10/18/21 194 lb (88 kg)   09/15/20 196 lb 3.2 oz (89 kg)   06/19/20 188 lb (85.3 kg)       Diabetes Mellitus Type 2: Current symptoms/problems include none. Medication compliance:  compliant all of the time  Diabetic diet compliance:  compliant most of the time,  Weight trend: increasing  Current exercise: no regular exercise and is having pain issues with feet  Barriers: none    Home blood sugar records: fasting range: 130-150's  Any episodes of hypoglycemia? no  Eye exam current (within one year): yes   reports that she has never smoked. She has never used smokeless tobacco.   Daily Aspirin? Yes    Up to date with pap  Foot exam with podiatry    Lab Results   Component Value Date    LABA1C 8.3 10/18/2021    LABA1C 8.1 07/13/2021    LABA1C 6.8 (H) 12/16/2020     Lab Results   Component Value Date    LABMICR 12 08/26/2015    CREATININE 0.76 07/13/2021     Lab Results   Component Value Date    ALT 19 07/13/2021    AST 17 07/13/2021     Lab Results   Component Value Date    CHOL 206 (H) 07/13/2021    TRIG 136 07/13/2021    HDL 71 07/13/2021    LDLDIRECT 214 (H) 01/15/2014        Nursing note reviewed and discussed with patient. Patient's medications, allergies, past medical, surgical, social and family histories were reviewed and updated asappropriate.     Current Outpatient Medications   Medication Sig Dispense Refill    carvedilol (COREG) 3.125 MG tablet take 1 tablet by mouth twice a day with food      dapagliflozin (FARXIGA) 5 MG tablet Take 1 tablet by mouth every morning 30 tablet 2    atorvastatin (LIPITOR) 40 MG tablet take 1 tablet by mouth once daily 30 tablet 5    metFORMIN (GLUCOPHAGE) 1000 MG tablet take 1 tablet by mouth twice a day with food 60 tablet 5    ibuprofen (ADVIL;MOTRIN) 800 MG tablet Take 1 tablet by mouth 3 times daily as needed for Pain 270 tablet 1    Multiple Vitamins-Minerals (MULTIVITAMIN PO) Take  by mouth. No current facility-administered medications for this visit. Past Medical History:   Diagnosis Date    Diabetes mellitus ([address]       Past Surgical History:   Procedure Laterality Date    BREAST SURGERY Bilateral     reduction    BUNIONECTOMY Bilateral     COLONOSCOPY  7/1/14     Family History   Problem Relation Age of Onset    Stroke Mother      Social History     Tobacco Use    Smoking status: Never Smoker    Smokeless tobacco: Never Used   Substance Use Topics    Alcohol use: Yes     Comment: occassional      Allergies   Allergen Reactions    Sulfa Antibiotics Hives       Subjective:      Review of Systems   Constitutional: Negative for chills and fever. Respiratory: Negative for cough and shortness of breath. Cardiovascular: Negative for chest pain, palpitations and leg swelling. Other pertinent ROS in HPI  Objective:     /70 (Site: Left Upper Arm, Position: Sitting, Cuff Size: Large Adult)   Pulse 75   Ht 5' 7\" (1.702 m)   Wt 194 lb (88 kg)   LMP 05/16/2016 (Approximate)   SpO2 99%   BMI 30.38 kg/m²    Physical Exam  Constitutional:       Appearance: She is well-developed. HENT:      Right Ear: External ear normal.      Left Ear: External ear normal.      Nose: Nose normal.   Cardiovascular:      Rate and Rhythm: Normal rate and regular rhythm. Heart sounds: Normal heart sounds, S1 normal and S2 normal.   Pulmonary:      Effort: Pulmonary effort is normal. No respiratory distress. Breath sounds: Normal breath sounds. Musculoskeletal:         General: No deformity. Normal range of motion.       Cervical back: Full passive range of motion without pain and normal range of motion. Skin:     General: Skin is warm and dry. Neurological:      Mental Status: She is alert and oriented to person, place, and time. Assessment/PLAN   1. Type 2 diabetes mellitus without complication, without long-term current use of insulin (HCC)    - POCT glycosylated hemoglobin (Hb A1C)  - POCT microalbumin  - dapagliflozin (FARXIGA) 5 MG tablet; Take 1 tablet by mouth every morning  Dispense: 30 tablet; Refill: 2  - POCT Urinalysis No Micro (Auto)    2. Encounter for screening mammogram for breast cancer    - FRAN DIGITAL SCREEN W OR WO CAD BILATERAL; Future    3. Leukocytes in urine    - Culture, Urine; Future      RTO if symptoms worsen or fail to improve  Pt agreeable with plan      Patient given educational materials - see patientinstructions. Discussed use, benefit, and side effects of prescribed medications. All patient questions answered. Pt voiced understanding. Reviewed health maintenance. Instructed to continue current medications, diet andexercise. 1.  Kimber received counseling on the following healthy behaviors: nutrition, exercise and medication adherence  2. Patient given educationalmaterials when available - see patient instructions when applicable  3. Discussed use, benefit, and side effects of prescribed medications. Barriersto medication compliance addressed. All patient questions answered. Pt voiced understanding. 4.  Reviewed prior labs and health maintenance when applicable. 5.  Continue current medications, diet and exercise. CompletedRefills   Requested Prescriptions     Signed Prescriptions Disp Refills    dapagliflozin (FARXIGA) 5 MG tablet 30 tablet 2     Sig: Take 1 tablet by mouth every morning       This note was transcribed using dictation with Dragon services. Efforts were made to correct any errors but some words may be misinterpreted.     Electronically signed by TERA Cifuentes CNP, CNP on 10/18/2021 at 8:57 AM

## 2021-10-19 ENCOUNTER — HOSPITAL ENCOUNTER (OUTPATIENT)
Dept: MAMMOGRAPHY | Age: 56
Discharge: HOME | End: 2021-10-21
Payer: COMMERCIAL

## 2021-10-19 DIAGNOSIS — Z12.31 ENCOUNTER FOR SCREENING MAMMOGRAM FOR BREAST CANCER: ICD-10-CM

## 2021-10-19 LAB
CULTURE: NORMAL
Lab: NORMAL
SPECIMEN DESCRIPTION: NORMAL

## 2021-10-19 PROCEDURE — 77063 BREAST TOMOSYNTHESIS BI: CPT

## 2022-01-03 ENCOUNTER — HOSPITAL ENCOUNTER (OUTPATIENT)
Dept: PREADMISSION TESTING | Age: 57
Discharge: HOME | End: 2022-01-07
Payer: COMMERCIAL

## 2022-01-03 VITALS
BODY MASS INDEX: 30.76 KG/M2 | DIASTOLIC BLOOD PRESSURE: 79 MMHG | RESPIRATION RATE: 16 BRPM | SYSTOLIC BLOOD PRESSURE: 136 MMHG | HEIGHT: 67 IN | WEIGHT: 196 LBS | HEART RATE: 88 BPM | TEMPERATURE: 97.7 F | OXYGEN SATURATION: 99 %

## 2022-01-03 LAB
ANION GAP SERPL CALCULATED.3IONS-SCNC: 16 MMOL/L (ref 9–17)
BUN BLDV-MCNC: 18 MG/DL (ref 6–20)
BUN/CREAT BLD: 21 (ref 9–20)
CALCIUM SERPL-MCNC: 9.8 MG/DL (ref 8.6–10.4)
CHLORIDE BLD-SCNC: 97 MMOL/L (ref 98–107)
CO2: 23 MMOL/L (ref 20–31)
CREAT SERPL-MCNC: 0.84 MG/DL (ref 0.5–0.9)
ESTIMATED AVERAGE GLUCOSE: 200 MG/DL
GFR AFRICAN AMERICAN: >60 ML/MIN
GFR NON-AFRICAN AMERICAN: >60 ML/MIN
GFR SERPL CREATININE-BSD FRML MDRD: ABNORMAL ML/MIN/{1.73_M2}
GFR SERPL CREATININE-BSD FRML MDRD: ABNORMAL ML/MIN/{1.73_M2}
GLUCOSE BLD-MCNC: 177 MG/DL (ref 70–99)
HBA1C MFR BLD: 8.6 % (ref 4–6)
HCT VFR BLD CALC: 38.8 % (ref 36.3–47.1)
HEMOGLOBIN: 12.7 G/DL (ref 11.9–15.1)
MCH RBC QN AUTO: 30.7 PG (ref 25.2–33.5)
MCHC RBC AUTO-ENTMCNC: 32.7 G/DL (ref 28.4–34.8)
MCV RBC AUTO: 93.7 FL (ref 82.6–102.9)
NRBC AUTOMATED: 0 PER 100 WBC
PDW BLD-RTO: 12 % (ref 11.8–14.4)
PLATELET # BLD: 332 K/UL (ref 138–453)
PMV BLD AUTO: 9.5 FL (ref 8.1–13.5)
POTASSIUM SERPL-SCNC: 4.4 MMOL/L (ref 3.7–5.3)
RBC # BLD: 4.14 M/UL (ref 3.95–5.11)
SODIUM BLD-SCNC: 136 MMOL/L (ref 135–144)
WBC # BLD: 9.9 K/UL (ref 3.5–11.3)

## 2022-01-03 PROCEDURE — 83036 HEMOGLOBIN GLYCOSYLATED A1C: CPT

## 2022-01-03 PROCEDURE — 80048 BASIC METABOLIC PNL TOTAL CA: CPT

## 2022-01-03 PROCEDURE — 93005 ELECTROCARDIOGRAM TRACING: CPT | Performed by: ANESTHESIOLOGY

## 2022-01-03 PROCEDURE — 36415 COLL VENOUS BLD VENIPUNCTURE: CPT

## 2022-01-03 PROCEDURE — 85027 COMPLETE CBC AUTOMATED: CPT

## 2022-01-03 ASSESSMENT — PAIN SCALES - GENERAL: PAINLEVEL_OUTOF10: 0

## 2022-01-03 NOTE — PRE-PROCEDURE INSTRUCTIONS
[facility] ON Thursday, January 20 at 1230 AM    Once you enter the hospital lobby, take the elevators to the second floor. Check-In is at the surgery registration desk. Continue to take your home medications as you normally do up to and including the night before surgery with the exception of any blood thinning medications. Please stop any blood thinning medications as directed by your surgeon or prescribing physician. Failure to stop certain medications may interfere with your scheduled surgery. These may include:  Aspirin, Warfarin (Coumadin), Clopidogrel (Plavix), Ibuprofen (Motrin, Advil), Naproxen (Aleve), Meloxicam (Mobic), Celecoxib (Celebrex), Eliquis, Pradaxa, Xarelto, Effient, Fish Oil, Herbal supplements. Stop all blood thinning medications 7 days prior to surgery as directed by physician    If you are diabetic, do not take any of your diabetic medications by mouth the morning of surgery. If you are taking insulin contact the doctor that manages your diabetes for instructions about any changes to your insulin dosages the day before surgery. Do not inject insulin or other injectable diabetic medications the morning of surgery unless otherwise instructed by the doctor who manages your diabetes. Please take the following medication(s) the day of surgery with a small sip of water:  Carvedilol         PREPARING FOR YOUR SURGERY:     Before surgery, you can play an important role in your own health. Because skin is not sterile, we need to be sure that your skin is as free of germs as possible before surgery by carefully washing before surgery. Preparing or prepping skin before surgery can reduce the risk of a surgical site infection.   Do not shave the area of your body where your surgery will be performed unless you received specific permission from your physician.     You will need to shower at home the night before surgery and the morning of surgery with a special soap called chlorhexidine gluconate (CHG*). *Not to be used by people allergic to Chlorhexidine Gluconate (CHG). Following these instructions will help you be sure that your skin is clean before surgery. Instructions on cleaning your skin before surgery: The night before your surgery:      You will need to shower with warm water (not hot) and the CHG soap.  Use a clean wash cloth and a clean towel. Have clean clothes available to put on after the shower.   First wash your hair with regular shampoo. Rinse your hair and body thoroughly to remove the shampoo.  Wash your face and genital area (private parts) with your regular soap or water only. Thoroughly rinse your body with warm water from the neck down.  Turn water off to prevent rinsing the soap off too soon.  With a clean wet washcloth and half of the CHG soap in the bottle, lather your entire body from the neck down. Do not use CHG soap near your eyes or ears to avoid injury to those areas.  Wash thoroughly, paying special attention to the area where your surgery will be performed.  Wash your body gently for five (5) minutes. Avoid scrubbing your skin too hard.  Turn the water back on and rinse your body thoroughly.  Pat yourself dry with a clean, soft towel. Do not apply lotion, cream or powder.  Dress with clean freshly washed clothes. The morning of surgery:     Repeat shower following steps above - using remaining half of CHG soap in bottle. Patient Instructions:    [place] If you are having any type of anesthesia you are to have nothing to eat or drink after midnight the night before your surgery. This includes gum, hard candy, mints, water or smoking or chewing tobacco.  The only exception to this is a small sip of water to take with any morning dose of heart, blood pressure, or seizure medications. No alcoholic beverages for 24 hours prior to surgery.      Brush your teeth but do not swallow water.  Bring your eye glasses and case with you. No contacts are to be worn the day of surgery. You also may bring your hearing aids. Most surgical procedures involving anesthesia will require that you remove your dentures prior to surgery. · Do not wear any jewelry or body piercings day of surgery. Also, NO lotion, perfume or deodorant to be used the day of surgery. No nail polish on the operative extremity (arm/leg surgeries)    · If you are staying overnight with us, please bring a small bag of necessary personal items.  Please wear loose, comfortable clothing. If you are potentially going to have a cast or brace bring clothing that will fit over them.  In case of illness - If you have cold or flu like symptoms (high fever, runny nose, sore throat, cough, etc.) rash, nausea, vomiting, loose stools, and/or recent contact with someone who has a contagious disease (chicken pox, measles, etc.) Please call your doctor before coming to the hospital.         Day of Surgery/Procedure:    As a patient at [facility] you can expect quality medical and nursing care that is centered on your individual needs. Our goal is to make your surgical experience as comfortable as possible    . Transportation After Your Surgery/Procedure: You will need a friend or family member to drive you home after your procedure. Your  must be 25years of age or older and able to sign off on your discharge instructions. A taxi cab or any other form of public transportation is not acceptable. Your friend or family member must stay at the hospital throughout your procedure. Someone must remain with you for the first 24 hours after your surgery if you receive anesthesia or medication. If you do not have someone to stay with you, your procedure may be cancelled.       If you have any other questions regarding your procedure or the day of surgery, please call [phone]      _________________________  ____________________________  Signature (Patient)              Signature (Provider)               Date

## 2022-01-04 LAB
EKG ATRIAL RATE: 72 BPM
EKG P AXIS: 57 DEGREES
EKG P-R INTERVAL: 166 MS
EKG Q-T INTERVAL: 378 MS
EKG QRS DURATION: 84 MS
EKG QTC CALCULATION (BAZETT): 413 MS
EKG R AXIS: 0 DEGREES
EKG T AXIS: 53 DEGREES
EKG VENTRICULAR RATE: 72 BPM

## 2022-01-04 PROCEDURE — 93010 ELECTROCARDIOGRAM REPORT: CPT | Performed by: INTERNAL MEDICINE

## 2022-01-06 ENCOUNTER — PATIENT MESSAGE (OUTPATIENT)
Dept: FAMILY MEDICINE CLINIC | Age: 57
End: 2022-01-06

## 2022-01-06 DIAGNOSIS — E11.9 TYPE 2 DIABETES MELLITUS WITHOUT COMPLICATION, WITHOUT LONG-TERM CURRENT USE OF INSULIN (HCC): ICD-10-CM

## 2022-01-06 RX ORDER — PIOGLITAZONEHYDROCHLORIDE 15 MG/1
15 TABLET ORAL DAILY
Qty: 30 TABLET | Refills: 1 | Status: SHIPPED | OUTPATIENT
Start: 2022-01-06 | End: 2022-01-10

## 2022-01-06 NOTE — TELEPHONE ENCOUNTER
Patient called the office and was informed to call her insurance and she what they cover better. Patient verbalized understanding.

## 2022-01-06 NOTE — TELEPHONE ENCOUNTER
From: Irvin Gan  To: Hal César  Sent: 1/6/2022 9:29 AM EST  Subject: A1C PLEASE READ ASAP    Lico Mobley,    I have been talking to you about the medicine the Farxiga I did the coupons like you said but its still 350.00 a month I can not afford that. I am suppose to have foot surgery on Jan 20 2020 but my A1C went up a little(8.6)and it sounds like the doctor is hesitant  to do surgery unless my A1C is down. ( the girl said the doctor would like it under 6.0 but i told her it has never been that low since i became a diabetic) I need to see what we can do ASAP.   Please call me today [phone]

## 2022-01-07 NOTE — TELEPHONE ENCOUNTER
Last visit: 10/18/2021  Last Med refill: 11/29/2021  Does patient have enough medication for 72 hours: No:     Next Visit Date:  Future Appointments   Date Time Provider Omar Barrera   1/19/2022  8:15 AM Meche Dress, APRN - CNP [facility] [address] Maintenance   Topic Date Due    COVID-19 Vaccine (1) Never done    Hepatitis B vaccine (1 of 3 - Risk 3-dose series) Never done    Cervical cancer screen  Never done    Shingles Vaccine (1 of 2) Never done    Diabetic foot exam  02/25/2020    Flu vaccine (1) 10/18/2022 (Originally 9/1/2021)    Pneumococcal 0-64 years Vaccine (1 of 2 - PPSV23) 10/18/2022 (Originally 9/21/1971)    Lipid screen  07/13/2022    Depression Screen  07/13/2022    Diabetic microalbuminuria test  10/18/2022    A1C test (Diabetic or Prediabetic)  01/03/2023    Diabetic retinal exam  05/17/2023    Breast cancer screen  10/19/2023    Colon cancer screen colonoscopy  07/01/2024    DTaP/Tdap/Td vaccine (3 - Td or Tdap) 01/28/2030    Hepatitis C screen  Completed    HIV screen  Completed    Hepatitis A vaccine  Aged Out    Hib vaccine  Aged Out    Meningococcal (ACWY) vaccine  Aged Out       Hemoglobin A1C (%)   Date Value   01/03/2022 8.6 (H)   10/18/2021 8.3   07/13/2021 8.1             ( goal A1C is < 7)   Microalb/Crt.  Ratio (mcg/mg creat)   Date Value   08/26/2015 12     LDL Cholesterol (mg/dL)   Date Value   07/13/2021 108   02/27/2019 100       (goal LDL is <100)   AST (U/L)   Date Value   07/13/2021 17     ALT (U/L)   Date Value   07/13/2021 19     BUN (mg/dL)   Date Value   01/03/2022 18     BP Readings from Last 3 Encounters:   01/03/22 136/79   10/18/21 130/70   09/15/20 130/74          (goal 120/80)    All Future Testing planned in [facility]  Lab Frequency Next Occurrence               Patient Active Problem List:     Metatarsalgia of both feet     Diabetes mellitus ([address]     Mixed hyperlipidemia     Abnormal cervical Papanicolaou smear

## 2022-01-10 ENCOUNTER — OFFICE VISIT (OUTPATIENT)
Dept: FAMILY MEDICINE CLINIC | Age: 57
End: 2022-01-10
Payer: COMMERCIAL

## 2022-01-10 VITALS
DIASTOLIC BLOOD PRESSURE: 84 MMHG | OXYGEN SATURATION: 98 % | SYSTOLIC BLOOD PRESSURE: 130 MMHG | WEIGHT: 196 LBS | HEART RATE: 81 BPM | BODY MASS INDEX: 30.7 KG/M2

## 2022-01-10 DIAGNOSIS — E11.9 TYPE 2 DIABETES MELLITUS WITHOUT COMPLICATION, WITHOUT LONG-TERM CURRENT USE OF INSULIN (HCC): Primary | ICD-10-CM

## 2022-01-10 PROCEDURE — 99213 OFFICE O/P EST LOW 20 MIN: CPT | Performed by: NURSE PRACTITIONER

## 2022-01-10 PROCEDURE — 3052F HG A1C>EQUAL 8.0%<EQUAL 9.0%: CPT | Performed by: NURSE PRACTITIONER

## 2022-01-10 RX ORDER — CALCIUM CARBONATE 300MG(750)
TABLET,CHEWABLE ORAL
COMMUNITY

## 2022-01-10 RX ORDER — GLIMEPIRIDE 2 MG/1
2 TABLET ORAL EVERY MORNING
Qty: 30 TABLET | Refills: 2 | Status: SHIPPED | OUTPATIENT
Start: 2022-01-10 | End: 2022-04-08

## 2022-01-10 ASSESSMENT — ENCOUNTER SYMPTOMS
COUGH: 0
SHORTNESS OF BREATH: 0

## 2022-01-10 NOTE — PROGRESS NOTES
Patient is present to discuss elevated sugars  States she is scheduled to have a procedure on 1/20, but states the surgeon needs her a1c to be lower; it is 8.6  Patient states she checked her sugar yesterday and it was 139 and this morning it was 173  States it is usually higher in the mornings

## 2022-01-10 NOTE — PROGRESS NOTES
[address] [place]  [phone]    Oksana Belcher is a 60 y.o. female who presents today for her  medical conditions/complaints as noted below. Oksana Belcher is c/o of Diabetes  . HPI:     HPI   Pt is concerned that her surgery is going to canceled because her sugars are so high. She is watching her diet. She is exercising more. bj was called in jan 6, she has used glimperide in the past with success. Her most recent a1c was 8.6. We added farxiga at last visit (oct)  but it was $350 a month, we were not notified that coupon didn't work until Jan.     Nursing note reviewed and discussed with patient. Patient's medications, allergies, past medical, surgical, social and family histories were reviewed and updated asappropriate. Current Outpatient Medications   Medication Sig Dispense Refill    Magnesium 400 MG TABS Take by mouth      glimepiride (AMARYL) 2 MG tablet Take 1 tablet by mouth every morning 30 tablet 2    carvedilol (COREG) 3.125 MG tablet take 1 tablet by mouth twice a day with food      atorvastatin (LIPITOR) 40 MG tablet take 1 tablet by mouth once daily 30 tablet 5    metFORMIN (GLUCOPHAGE) 1000 MG tablet take 1 tablet by mouth twice a day with food 60 tablet 5    ibuprofen (ADVIL;MOTRIN) 800 MG tablet Take 1 tablet by mouth 3 times daily as needed for Pain 270 tablet 1    Multiple Vitamins-Minerals (MULTIVITAMIN PO) Take  by mouth. No current facility-administered medications for this visit.      Past Medical History:   Diagnosis Date    Diabetes mellitus ([address]     Hyperlipidemia     Palpitations       Past Surgical History:   Procedure Laterality Date    BREAST REDUCTION SURGERY      BREAST SURGERY Bilateral     reduction    BUNIONECTOMY Bilateral     COLONOSCOPY  7/1/14     Family History   Problem Relation Age of Onset    Stroke Mother      Social History     Tobacco Use    Smoking status: Never Smoker    Smokeless tobacco: Never Used   Substance Use Topics    Alcohol use: Not Currently     Comment: rarely      Allergies   Allergen Reactions    Sulfa Antibiotics Hives       Subjective:      Review of Systems   Constitutional: Negative for chills and fever. Respiratory: Negative for cough and shortness of breath. Cardiovascular: Negative for chest pain, palpitations and leg swelling. Other pertinent ROS in HPI  Objective:     /84 (Site: Left Upper Arm, Position: Sitting, Cuff Size: Large Adult)   Pulse 81   Wt 196 lb (88.9 kg)   LMP 05/16/2016 (Approximate)   SpO2 98%   BMI 30.70 kg/m²    Physical Exam  Constitutional:       Appearance: She is well-developed. HENT:      Right Ear: External ear normal.      Left Ear: External ear normal.      Nose: Nose normal.   Cardiovascular:      Rate and Rhythm: Normal rate and regular rhythm. Heart sounds: Normal heart sounds, S1 normal and S2 normal.   Pulmonary:      Effort: Pulmonary effort is normal. No respiratory distress. Breath sounds: Normal breath sounds. Musculoskeletal:         General: No deformity. Normal range of motion. Cervical back: Full passive range of motion without pain and normal range of motion. Skin:     General: Skin is warm and dry. Neurological:      Mental Status: She is alert and oriented to person, place, and time. Assessment/PLAN   1. Type 2 diabetes mellitus without complication, without long-term current use of insulin ([facility])  Will use glimepiride instead of actos as she has used this and tolerated in the past  - glimepiride (AMARYL) 2 MG tablet; Take 1 tablet by mouth every morning  Dispense: 30 tablet; Refill: 2      RTO if symptoms worsen or fail to improve  Pt agreeable with plan      Patient given educational materials - see patientinstructions. Discussed use, benefit, and side effects of prescribed medications. All patient questions answered. Pt voiced understanding.  Reviewed health maintenance. Instructed to continue current medications, diet andexercise. 1.  Kimber received counseling on the following healthy behaviors: medication adherence  2. Patient given educationalmaterials when available - see patient instructions when applicable  3. Discussed use, benefit, and side effects of prescribed medications. Barriersto medication compliance addressed. All patient questions answered. Pt voiced understanding. 4.  Reviewed prior labs and health maintenance when applicable. 5.  Continue current medications, diet and exercise. CompletedRefills   Requested Prescriptions     Signed Prescriptions Disp Refills    glimepiride (AMARYL) 2 MG tablet 30 tablet 2     Sig: Take 1 tablet by mouth every morning       This note was transcribed using dictation with Dragon services. Efforts were made to correct any errors but some words may be misinterpreted.     Electronically signed by TERA Trent CNP, CNP on 1/10/2022 at 11:57 AM

## 2022-01-12 RX ORDER — ATORVASTATIN CALCIUM 40 MG/1
TABLET, FILM COATED ORAL
Qty: 30 TABLET | Refills: 5 | Status: SHIPPED | OUTPATIENT
Start: 2022-01-12 | End: 2022-07-12 | Stop reason: SDUPTHER

## 2022-01-20 ENCOUNTER — HOSPITAL ENCOUNTER (OUTPATIENT)
Age: 57
Setting detail: OUTPATIENT SURGERY
Discharge: HOME | End: 2022-01-20
Attending: PODIATRIST | Admitting: PODIATRIST
Payer: COMMERCIAL

## 2022-01-20 ENCOUNTER — APPOINTMENT (OUTPATIENT)
Dept: GENERAL RADIOLOGY | Age: 57
End: 2022-01-20
Attending: PODIATRIST
Payer: COMMERCIAL

## 2022-01-20 ENCOUNTER — ANESTHESIA (OUTPATIENT)
Dept: OPERATING ROOM | Age: 57
End: 2022-01-20
Payer: COMMERCIAL

## 2022-01-20 ENCOUNTER — ANESTHESIA EVENT (OUTPATIENT)
Dept: OPERATING ROOM | Age: 57
End: 2022-01-20
Payer: COMMERCIAL

## 2022-01-20 VITALS — TEMPERATURE: 95.4 F | DIASTOLIC BLOOD PRESSURE: 55 MMHG | OXYGEN SATURATION: 100 % | SYSTOLIC BLOOD PRESSURE: 87 MMHG

## 2022-01-20 VITALS
WEIGHT: 196 LBS | TEMPERATURE: 97.2 F | SYSTOLIC BLOOD PRESSURE: 146 MMHG | BODY MASS INDEX: 30.7 KG/M2 | OXYGEN SATURATION: 93 % | RESPIRATION RATE: 13 BRPM | HEART RATE: 74 BPM | DIASTOLIC BLOOD PRESSURE: 85 MMHG

## 2022-01-20 DIAGNOSIS — G89.18 POST-OPERATIVE PAIN: Primary | ICD-10-CM

## 2022-01-20 LAB
GLUCOSE BLD-MCNC: 77 MG/DL (ref 65–105)
GLUCOSE BLD-MCNC: 95 MG/DL (ref 65–105)

## 2022-01-20 PROCEDURE — 2580000003 HC RX 258: Performed by: ANESTHESIOLOGY

## 2022-01-20 PROCEDURE — 7100000000 HC PACU RECOVERY - FIRST 15 MIN: Performed by: PODIATRIST

## 2022-01-20 PROCEDURE — 2500000003 HC RX 250 WO HCPCS: Performed by: PODIATRIST

## 2022-01-20 PROCEDURE — 3209999900 FLUORO FOR SURGICAL PROCEDURES

## 2022-01-20 PROCEDURE — 7100000011 HC PHASE II RECOVERY - ADDTL 15 MIN: Performed by: PODIATRIST

## 2022-01-20 PROCEDURE — 82947 ASSAY GLUCOSE BLOOD QUANT: CPT

## 2022-01-20 PROCEDURE — 3600000002 HC SURGERY LEVEL 2 BASE: Performed by: PODIATRIST

## 2022-01-20 PROCEDURE — 7100000010 HC PHASE II RECOVERY - FIRST 15 MIN: Performed by: PODIATRIST

## 2022-01-20 PROCEDURE — 73630 X-RAY EXAM OF FOOT: CPT

## 2022-01-20 PROCEDURE — 3700000000 HC ANESTHESIA ATTENDED CARE: Performed by: PODIATRIST

## 2022-01-20 PROCEDURE — 3600000012 HC SURGERY LEVEL 2 ADDTL 15MIN: Performed by: PODIATRIST

## 2022-01-20 PROCEDURE — 2500000003 HC RX 250 WO HCPCS: Performed by: NURSE ANESTHETIST, CERTIFIED REGISTERED

## 2022-01-20 PROCEDURE — 2709999900 HC NON-CHARGEABLE SUPPLY: Performed by: PODIATRIST

## 2022-01-20 PROCEDURE — 2720000010 HC SURG SUPPLY STERILE: Performed by: PODIATRIST

## 2022-01-20 PROCEDURE — 3700000001 HC ADD 15 MINUTES (ANESTHESIA): Performed by: PODIATRIST

## 2022-01-20 PROCEDURE — C1713 ANCHOR/SCREW BN/BN,TIS/BN: HCPCS | Performed by: PODIATRIST

## 2022-01-20 PROCEDURE — 7100000001 HC PACU RECOVERY - ADDTL 15 MIN: Performed by: PODIATRIST

## 2022-01-20 PROCEDURE — 6360000002 HC RX W HCPCS: Performed by: NURSE ANESTHETIST, CERTIFIED REGISTERED

## 2022-01-20 DEVICE — MINI MONSTER HEADED, SHORT THREAD, 3.0 X 28MM
Type: IMPLANTABLE DEVICE | Site: FOOT | Status: FUNCTIONAL
Brand: MONSTER SCREW SYSTEM

## 2022-01-20 DEVICE — MINI MONSTER HEADED, SHORT THREAD, 3.0 X 24MM
Type: IMPLANTABLE DEVICE | Site: FOOT | Status: FUNCTIONAL
Brand: MONSTER SCREW SYSTEM

## 2022-01-20 DEVICE — K WIRE .062 OR 1.6MM: Type: IMPLANTABLE DEVICE | Site: FOOT | Status: FUNCTIONAL

## 2022-01-20 RX ORDER — PROPOFOL 10 MG/ML
INJECTION, EMULSION INTRAVENOUS PRN
Status: DISCONTINUED | OUTPATIENT
Start: 2022-01-20 | End: 2022-01-20 | Stop reason: SDUPTHER

## 2022-01-20 RX ORDER — FENTANYL CITRATE 50 UG/ML
INJECTION, SOLUTION INTRAMUSCULAR; INTRAVENOUS PRN
Status: DISCONTINUED | OUTPATIENT
Start: 2022-01-20 | End: 2022-01-20 | Stop reason: SDUPTHER

## 2022-01-20 RX ORDER — PROMETHAZINE HYDROCHLORIDE 25 MG/ML
6.25 INJECTION, SOLUTION INTRAMUSCULAR; INTRAVENOUS
Status: DISCONTINUED | OUTPATIENT
Start: 2022-01-20 | End: 2022-01-20 | Stop reason: HOSPADM

## 2022-01-20 RX ORDER — FENTANYL CITRATE 50 UG/ML
25 INJECTION, SOLUTION INTRAMUSCULAR; INTRAVENOUS EVERY 5 MIN PRN
Status: DISCONTINUED | OUTPATIENT
Start: 2022-01-20 | End: 2022-01-20 | Stop reason: HOSPADM

## 2022-01-20 RX ORDER — KETOROLAC TROMETHAMINE 30 MG/ML
INJECTION, SOLUTION INTRAMUSCULAR; INTRAVENOUS PRN
Status: DISCONTINUED | OUTPATIENT
Start: 2022-01-20 | End: 2022-01-20 | Stop reason: SDUPTHER

## 2022-01-20 RX ORDER — SODIUM CHLORIDE, SODIUM LACTATE, POTASSIUM CHLORIDE, CALCIUM CHLORIDE 600; 310; 30; 20 MG/100ML; MG/100ML; MG/100ML; MG/100ML
INJECTION, SOLUTION INTRAVENOUS CONTINUOUS
Status: DISCONTINUED | OUTPATIENT
Start: 2022-01-21 | End: 2022-01-20 | Stop reason: HOSPADM

## 2022-01-20 RX ORDER — ONDANSETRON 2 MG/ML
INJECTION INTRAMUSCULAR; INTRAVENOUS PRN
Status: DISCONTINUED | OUTPATIENT
Start: 2022-01-20 | End: 2022-01-20 | Stop reason: SDUPTHER

## 2022-01-20 RX ORDER — MIDAZOLAM HYDROCHLORIDE 1 MG/ML
INJECTION INTRAMUSCULAR; INTRAVENOUS PRN
Status: DISCONTINUED | OUTPATIENT
Start: 2022-01-20 | End: 2022-01-20 | Stop reason: SDUPTHER

## 2022-01-20 RX ORDER — HYDROMORPHONE HYDROCHLORIDE 1 MG/ML
0.5 INJECTION, SOLUTION INTRAMUSCULAR; INTRAVENOUS; SUBCUTANEOUS EVERY 5 MIN PRN
Status: DISCONTINUED | OUTPATIENT
Start: 2022-01-20 | End: 2022-01-20 | Stop reason: HOSPADM

## 2022-01-20 RX ORDER — LIDOCAINE HYDROCHLORIDE 10 MG/ML
1 INJECTION, SOLUTION EPIDURAL; INFILTRATION; INTRACAUDAL; PERINEURAL
Status: DISCONTINUED | OUTPATIENT
Start: 2022-01-21 | End: 2022-01-20 | Stop reason: HOSPADM

## 2022-01-20 RX ORDER — OXYCODONE HYDROCHLORIDE AND ACETAMINOPHEN 5; 325 MG/1; MG/1
1 TABLET ORAL PRN
Status: DISCONTINUED | OUTPATIENT
Start: 2022-01-20 | End: 2022-01-20 | Stop reason: HOSPADM

## 2022-01-20 RX ORDER — CEFAZOLIN SODIUM 1 G/3ML
INJECTION, POWDER, FOR SOLUTION INTRAMUSCULAR; INTRAVENOUS PRN
Status: DISCONTINUED | OUTPATIENT
Start: 2022-01-20 | End: 2022-01-20 | Stop reason: SDUPTHER

## 2022-01-20 RX ORDER — ONDANSETRON 2 MG/ML
4 INJECTION INTRAMUSCULAR; INTRAVENOUS
Status: DISCONTINUED | OUTPATIENT
Start: 2022-01-20 | End: 2022-01-20 | Stop reason: HOSPADM

## 2022-01-20 RX ORDER — PHENYLEPHRINE HCL IN 0.9% NACL 1 MG/10 ML
SYRINGE (ML) INTRAVENOUS PRN
Status: DISCONTINUED | OUTPATIENT
Start: 2022-01-20 | End: 2022-01-20 | Stop reason: SDUPTHER

## 2022-01-20 RX ORDER — DEXAMETHASONE SODIUM PHOSPHATE 10 MG/ML
INJECTION INTRAMUSCULAR; INTRAVENOUS PRN
Status: DISCONTINUED | OUTPATIENT
Start: 2022-01-20 | End: 2022-01-20 | Stop reason: SDUPTHER

## 2022-01-20 RX ORDER — OXYCODONE HYDROCHLORIDE AND ACETAMINOPHEN 5; 325 MG/1; MG/1
2 TABLET ORAL PRN
Status: DISCONTINUED | OUTPATIENT
Start: 2022-01-20 | End: 2022-01-20 | Stop reason: HOSPADM

## 2022-01-20 RX ORDER — LIDOCAINE HYDROCHLORIDE 20 MG/ML
INJECTION, SOLUTION EPIDURAL; INFILTRATION; INTRACAUDAL; PERINEURAL PRN
Status: DISCONTINUED | OUTPATIENT
Start: 2022-01-20 | End: 2022-01-20 | Stop reason: SDUPTHER

## 2022-01-20 RX ORDER — LABETALOL HYDROCHLORIDE 5 MG/ML
5 INJECTION, SOLUTION INTRAVENOUS EVERY 10 MIN PRN
Status: DISCONTINUED | OUTPATIENT
Start: 2022-01-20 | End: 2022-01-20 | Stop reason: HOSPADM

## 2022-01-20 RX ORDER — BUPIVACAINE HYDROCHLORIDE 5 MG/ML
INJECTION, SOLUTION EPIDURAL; INTRACAUDAL PRN
Status: DISCONTINUED | OUTPATIENT
Start: 2022-01-20 | End: 2022-01-20 | Stop reason: ALTCHOICE

## 2022-01-20 RX ORDER — HYDRALAZINE HYDROCHLORIDE 20 MG/ML
5 INJECTION INTRAMUSCULAR; INTRAVENOUS EVERY 10 MIN PRN
Status: DISCONTINUED | OUTPATIENT
Start: 2022-01-20 | End: 2022-01-20 | Stop reason: HOSPADM

## 2022-01-20 RX ORDER — OXYCODONE HYDROCHLORIDE AND ACETAMINOPHEN 5; 325 MG/1; MG/1
1 TABLET ORAL EVERY 6 HOURS PRN
Qty: 28 TABLET | Refills: 0 | Status: SHIPPED | OUTPATIENT
Start: 2022-01-20 | End: 2022-01-27

## 2022-01-20 RX ADMIN — Medication 50 MCG: at 13:44

## 2022-01-20 RX ADMIN — KETOROLAC TROMETHAMINE 30 MG: 30 INJECTION, SOLUTION INTRAMUSCULAR; INTRAVENOUS at 14:46

## 2022-01-20 RX ADMIN — MIDAZOLAM 2 MG: 1 INJECTION INTRAMUSCULAR; INTRAVENOUS at 13:37

## 2022-01-20 RX ADMIN — Medication 100 MCG: at 14:28

## 2022-01-20 RX ADMIN — DEXAMETHASONE SODIUM PHOSPHATE 10 MG: 10 INJECTION INTRAMUSCULAR; INTRAVENOUS at 13:50

## 2022-01-20 RX ADMIN — PROPOFOL 150 MG: 10 INJECTION, EMULSION INTRAVENOUS at 13:44

## 2022-01-20 RX ADMIN — SODIUM CHLORIDE, POTASSIUM CHLORIDE, SODIUM LACTATE AND CALCIUM CHLORIDE: 600; 310; 30; 20 INJECTION, SOLUTION INTRAVENOUS at 11:08

## 2022-01-20 RX ADMIN — Medication 200 MCG: at 14:38

## 2022-01-20 RX ADMIN — Medication 100 MCG: at 14:21

## 2022-01-20 RX ADMIN — Medication 200 MCG: at 14:57

## 2022-01-20 RX ADMIN — Medication 200 MCG: at 14:48

## 2022-01-20 RX ADMIN — SODIUM CHLORIDE, POTASSIUM CHLORIDE, SODIUM LACTATE AND CALCIUM CHLORIDE: 600; 310; 30; 20 INJECTION, SOLUTION INTRAVENOUS at 14:44

## 2022-01-20 RX ADMIN — ONDANSETRON 4 MG: 2 INJECTION INTRAMUSCULAR; INTRAVENOUS at 14:49

## 2022-01-20 RX ADMIN — CEFAZOLIN SODIUM 2000 MG: 1 INJECTION, POWDER, FOR SOLUTION INTRAMUSCULAR; INTRAVENOUS at 13:37

## 2022-01-20 RX ADMIN — Medication 50 MCG: at 13:37

## 2022-01-20 RX ADMIN — LIDOCAINE HYDROCHLORIDE 100 MG: 20 INJECTION, SOLUTION EPIDURAL; INFILTRATION; INTRACAUDAL; PERINEURAL at 13:44

## 2022-01-20 ASSESSMENT — PULMONARY FUNCTION TESTS
PIF_VALUE: 14
PIF_VALUE: 15
PIF_VALUE: 15
PIF_VALUE: 14
PIF_VALUE: 15
PIF_VALUE: 14
PIF_VALUE: 14
PIF_VALUE: 0
PIF_VALUE: 14
PIF_VALUE: 0
PIF_VALUE: 14
PIF_VALUE: 15
PIF_VALUE: 14
PIF_VALUE: 0
PIF_VALUE: 14
PIF_VALUE: 15
PIF_VALUE: 0
PIF_VALUE: 14
PIF_VALUE: 13
PIF_VALUE: 13
PIF_VALUE: 14
PIF_VALUE: 16
PIF_VALUE: 14
PIF_VALUE: 16
PIF_VALUE: 14
PIF_VALUE: 1
PIF_VALUE: 14
PIF_VALUE: 15
PIF_VALUE: 14
PIF_VALUE: 15
PIF_VALUE: 14
PIF_VALUE: 0
PIF_VALUE: 1
PIF_VALUE: 14
PIF_VALUE: 15
PIF_VALUE: 15
PIF_VALUE: 14
PIF_VALUE: 15
PIF_VALUE: 14
PIF_VALUE: 15
PIF_VALUE: 14
PIF_VALUE: 14
PIF_VALUE: 1
PIF_VALUE: 14
PIF_VALUE: 14
PIF_VALUE: 15
PIF_VALUE: 14
PIF_VALUE: 11
PIF_VALUE: 14
PIF_VALUE: 4
PIF_VALUE: 14
PIF_VALUE: 14
PIF_VALUE: 0
PIF_VALUE: 0
PIF_VALUE: 14
PIF_VALUE: 14

## 2022-01-20 ASSESSMENT — PAIN SCALES - GENERAL
PAINLEVEL_OUTOF10: 0

## 2022-01-20 ASSESSMENT — PAIN - FUNCTIONAL ASSESSMENT
PAIN_FUNCTIONAL_ASSESSMENT: 0-10
PAIN_FUNCTIONAL_ASSESSMENT: 0-10

## 2022-01-20 NOTE — ANESTHESIA POSTPROCEDURE EVALUATION
Department of Anesthesiology  Postprocedure Note    Patient: Milena Jefferson  MRN: [id number]  YOB: 1965  Date of evaluation: 1/20/2022  Time:  5:01 PM     Procedure Summary     Date: 01/20/22 Room / Location: [address] [place] - INPATIENT    Anesthesia Start: 4813 Anesthesia Stop: 1848    Procedure: LEFT FOOT MTPJ FUSION WITH HAMMERTOE CORRECTION 2ND DIGIT     TENOTOMY AND CAPSULOTOMY 2ND DIGIT   METATARSAL OSTEOTOMY 2ND DIGIT (Left Foot) Diagnosis: (DX DEFORMITY OF 1ST MTPJ WITH  HAMMERTOE AND BUNION LEFT 2ND)    Surgeons: Maria C Johnson DPM Responsible Provider: Meron Enciso DO    Anesthesia Type: general ASA Status: 3          Anesthesia Type: general    Issac Phase I: Issac Score: 10    Issac Phase II: Issac Score: 10    Last vitals: Reviewed and per EMR flowsheets.        Anesthesia Post Evaluation    Patient location during evaluation: PACU  Patient participation: complete - patient participated  Level of consciousness: awake and alert  Airway patency: patent  Nausea & Vomiting: no nausea and no vomiting  Complications: no  Cardiovascular status: hemodynamically stable  Respiratory status: acceptable  Hydration status: stable

## 2022-01-20 NOTE — H&P
History and Physical Service   [facility] [place] - INPATIENT    HISTORY AND PHYSICAL EXAMINATION            Date of Evaluation: 1/20/2022  Patient name:  Dino Stanford  MRN:   [id number]  YOB: 1965  PCP:    TERA Eng CNP    History Obtained From:     Patient, medical records    History of Present Illness: This is Dino Stanford a 60 y.o. female who presents today for a Left  Foot 1st MTPJ fusion with hammertoe correction 2nd left digit tenotomy and capsulotomy, 2nd digit possible metatarsal osteotomy 2nd digit by Dr. Cirilo Villegas for deformity 1st MTPJ with hammertoe deformity and bunion 2nd left digit. Patient Hx of bunions with surgical intervention 30 years ago  In the past several years she began to have left foot pain \"from her left second toe bending and swelling\" This progressively worsened and was aggravated by standing, walking long periods and wearing closed fitted tight shoes. She was evaluated by Dr Cirilo Villegas 9/28/21 Patient completed the Medrol dose pack which helped  She was advised to take NSAIDs, apply padding, use orthotics which she already had and wear wide shoes. The patient tried these treatment but continues to have discomfort and was scheduled for surgery. She denies a recent fever, chills, cough, wheezing or SOB. No open sores or Hx MRSA. +DM POC BS 95  Last Ibuprofen 800mg  1/13/22    Past Medical History:     Past Medical History:   Diagnosis Date    Diabetes mellitus [facility]     Hyperlipidemia     Palpitations         Past Surgical History:     Past Surgical History:   Procedure Laterality Date    BREAST REDUCTION SURGERY      BREAST SURGERY Bilateral     reduction    BUNIONECTOMY Bilateral     COLONOSCOPY  7/1/14        Medications Prior to Admission:     Prior to Admission medications    Medication Sig Start Date End Date Taking?  Authorizing Provider   metFORMIN (GLUCOPHAGE) 1000 MG tablet take 1 tablet by mouth twice a day with food 1/12/22  Yes TERA Williamson CNP   atorvastatin (LIPITOR) 40 MG tablet take 1 tablet by mouth once daily 1/12/22  Yes TERA Williamson CNP   Magnesium 400 MG TABS Take by mouth   Yes Historical Provider, MD   glimepiride (AMARYL) 2 MG tablet Take 1 tablet by mouth every morning 1/10/22  Yes TERA Williamson CNP   carvedilol (COREG) 3.125 MG tablet take 1 tablet by mouth twice a day with food 10/5/21  Yes Historical Provider, MD   Multiple Vitamins-Minerals (MULTIVITAMIN PO) Take  by mouth. Yes Historical Provider, MD   ibuprofen (ADVIL;MOTRIN) 800 MG tablet Take 1 tablet by mouth 3 times daily as needed for Pain 7/13/21   TERA Williamson CNP        Allergies:     Sulfa antibiotics    Social History:     Tobacco:    reports that she has never smoked. She has never used smokeless tobacco.  Alcohol:      reports previous alcohol use. Drug Use:  reports no history of drug use. Family History:     Family History   Problem Relation Age of Onset    Stroke Mother        Review of Systems:     Positive and Negative as described in HPI. CONSTITUTIONAL:  negative for fevers, chills, sweats, fatigue, weight loss  HEENT:  negative for vision, hearing changes, runny nose, throat pain  RESPIRATORY:  negative for shortness of breath, cough, congestion, wheezing. CARDIOVASCULAR: Hx palpitations Stated she was worked up by Cardiologist, Dr Wolfgang Cockayne  Wore 24 and 2 week Holter monitor that was negative Patient has annual evaluations. negative for chest pain, palpitations.   GASTROINTESTINAL:  negative for nausea, vomiting, diarrhea, constipation, change in bowel habits, abdominal pain   GENITOURINARY:  negative for difficulty of urination, burning with urination, frequency   INTEGUMENT:  negative for rash, skin lesions, easy bruising   HEMATOLOGIC/LYMPHATIC:  negative for swelling/edema   ALLERGIC/IMMUNOLOGIC:  negative for urticaria , itching  ENDOCRINE: Hx DM  Checks sugars Last AIC 8.6% On metformin and recently started on glimepiride by PCP negative increase in drinking, increase in urination, hot or cold intolerance  MUSCULOSKELETAL: See HPI  negative joint pains, muscle aches, swelling of joints  NEUROLOGICAL:  negative for headaches, dizziness, lightheadedness, numbness, pain, tingling extremities  BEHAVIOR/PSYCH:  negative for depression, anxiety    Physical Exam:   /82   Pulse 84   Temp 96.9 °F (36.1 °C)   Resp 16   Wt 196 lb (88.9 kg)   LMP 05/16/2016 (Approximate)   SpO2 100%   BMI 30.70 kg/m²   Patient's last menstrual period was 05/16/2016 (approximate). No obstetric history on file. Recent Labs     01/20/22  1108   POCGLU 95       General Appearance: obese  alert, well appearing, and in no acute distress  Mental status: oriented to person, place, and time with normal affect  Head:  normocephalic, atraumatic. Eye: no icterus, redness, pupils equal and reactive, extraocular eye movements intact, conjunctiva clear  Ear: normal external ear, no discharge, hearing intact  Nose:  no drainage noted  Mouth: mucous membranes moist  Neck: supple, no carotid bruits, thyroid not palpable  Lungs: Bilateral equal air entry, clear to ausculation, no wheezing, rales or rhonchi, normal effort  Cardiovascular: HR 84  normal rate, regular rhythm at present, no murmur, gallop, rub.   Abdomen: Soft, round, nontender, nondistended, normal bowel sounds  Neurologic: There are no new focal motor or sensory deficits, normal muscle tone and bulk, no abnormal sensation, normal speech, cranial nerves II through XII grossly intact  Skin: No gross lesions, rashes, bruising or bleeding on exposed skin area  Extremities: hammertoe deformity 2nd left toe  peripheral pulses palpable, no pedal edema or calf pain with palpation  Psych: normal affect     Investigations:      Laboratory Testing:  Recent Results (from the past 24 hour(s))   POC Glucose Fingerstick    Collection Time: 01/20/22 11:08 AM   Result Value Ref Range    POC Glucose 95 65 - 105 mg/dL       Recent Labs     01/03/22  1137   HGB 12.7   HCT 38.8   WBC 9.9   MCV 93.7      K 4.4   CL 97*   CO2 23   BUN 18   CREATININE 0.84   GLUCOSE 177*       No results for input(s): COVID19 in the last 720 hours. Imaging/Diagnostics:    No results found. Diagnosis:      1. Deformity 1st MTPJ with hammertoe deformity and bunion 2nd left digit    Plans:     1.  Left  Foot MTPJ fusion with hammertoe correction 2nd left digit tenotomy and capsulotomy, 2nd digit possible metatarsal osteotomy 2nd digit      Maudine Drivers, APRN - CNP  1/20/2022  12:35 PM

## 2022-01-20 NOTE — BRIEF OP NOTE
PODIATRY BRIEF OP NOTE    PATIENT NAME: Quin Esteban  YOB: 1965  -  60 y.o. female  MRN: [id number]  DATE: 1/20/2022  BILLING #: [id number]    Surgeon(s):  Whitney Domingo DPM     ASSISTANTS: Angle Mace DPM PGY-2    PRE-OP DIAGNOSIS:   1. Painful HAV of left foot. 2. Painful to 2nd MPJ of left foot. 3. 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 PROCEDURE:   1. 1st MPJ arthrodesis, left  2. PIPJ arthrodesis, left foot  3. Shortening metatarsal osteotomy, 2nd metatarsal left foot. ANESTHESIA: General     HEMOSTASIS: pneumatic ankle tourniquet, left foot    ESTIMATED BLOOD LOSS: Less than 5cc. MATERIALS:   Implant Name Type Inv. Item Serial No.  Lot No. LRB No. Used Action   SCREW BNE L28MM DIA3MM SHT THRD HD MINI-MONSTER  SCREW BNE L28MM DIA3MM SHT THRD HD MINI-MONSTER  [employer] 28-WD  Left 1 Implanted   SCREW BNE L24MM OD3MM ROBERTA HD SLD SHT THRD MINI-MONSTER  SCREW BNE L24MM OD3MM ROBERTA HD SLD SHT THRD MINI-MONSTER  [employer] 28-WD  Left 1 Implanted   GUIDEWIRE SURG L150MM DIA1. 6MM NICKEL CHROM SMOOTH SGL END  GUIDEWIRE SURG L150MM DIA1. 6MM NICKEL CHROM SMOOTH SGL END  [employer]ON 28-WD  Left 1 Implanted and Explanted   K WIRE .062 OR 1.6MM  K WIRE .062 OR 1.6MM  [employer]WD  Left 1 Implanted       INJECTABLES:   Pre-op 10cc 1:1 mix of 0.5% marcaine plain and 1% lidocaine plain  Post-op 10cc 0.5% Marcaine plain. SPECIMEN:   * No specimens in log *    COMPLICATIONS: none    FINDINGS: see op note for detail    The patient was counseled at length about the risks of valery Covid-19 during their perioperative period and any recovery window from their procedure. The patient was made aware that valery Covid-19  may worsen their prognosis for recovering from their procedure  and lend to a higher morbidity and/or mortality risk.   All material risks, benefits, and reasonable alternatives including postponing the procedure were discussed. The patient does wish to proceed with the procedure at this time.     Phil Montes DPM   Podiatric Medicine & Surgery   1/20/2022 at 3:49 PM

## 2022-01-20 NOTE — ANESTHESIA PRE PROCEDURE
Department of Anesthesiology  Preprocedure Note       Name:  Irvin Gan   Age:  60 y.o.  :  1965                                          MRN:  [id number]         Date:  2022      Surgeon: Rush Cardenas):  Kannan Farfan DPM    Procedure: Procedure(s):  LEFT FOOT MTPJ FUSION WITH HAMMERTOE CORRECTION 2ND DIGIT     TENOTOMY AND CAPSULOTOMY 2ND DIGIT   POSSIBLE METATARSAL OSTEOTOMY 2ND DIGIT    Medications prior to admission:   Prior to Admission medications    Medication Sig Start Date End Date Taking? Authorizing Provider   metFORMIN (GLUCOPHAGE) 1000 MG tablet take 1 tablet by mouth twice a day with food 22  Yes TERA Williamson CNP   atorvastatin (LIPITOR) 40 MG tablet take 1 tablet by mouth once daily 22  Yes TERA Williamson CNP   Magnesium 400 MG TABS Take by mouth   Yes Historical Provider, MD   glimepiride (AMARYL) 2 MG tablet Take 1 tablet by mouth every morning 1/10/22  Yes TERA Williamson CNP   carvedilol (COREG) 3.125 MG tablet take 1 tablet by mouth twice a day with food 10/5/21  Yes Historical Provider, MD   Multiple Vitamins-Minerals (MULTIVITAMIN PO) Take  by mouth. Yes Historical Provider, MD   ibuprofen (ADVIL;MOTRIN) 800 MG tablet Take 1 tablet by mouth 3 times daily as needed for Pain 21   TERA Williamson CNP       Current medications:    Current Facility-Administered Medications   Medication Dose Route Frequency Provider Last Rate Last Admin    [START ON 2022] lactated ringers infusion   IntraVENous Continuous Duane Leander, MD 50 mL/hr at 22 1108 New Bag at 22 1108    [START ON 2022] lidocaine PF 1 % injection 1 mL  1 mL IntraDERmal Once PRN Duane Leander, MD           Allergies:     Allergies   Allergen Reactions    Sulfa Antibiotics Hives       Problem List:    Patient Active Problem List   Diagnosis Code    Metatarsalgia of both feet M77.41, M77.42    Diabetes mellitus ([facility] [address] E11.9    Mixed hyperlipidemia E78.2    Abnormal cervical Papanicolaou smear R87.619       Past Medical History:        Diagnosis Date    Diabetes mellitus ([facility] [address]     Hyperlipidemia     Palpitations        Past Surgical History:        Procedure Laterality Date    BREAST REDUCTION SURGERY      BREAST SURGERY Bilateral     reduction    BUNIONECTOMY Bilateral     COLONOSCOPY  7/1/14       Social History:    Social History     Tobacco Use    Smoking status: Never Smoker    Smokeless tobacco: Never Used   Substance Use Topics    Alcohol use: Not Currently     Comment: rarely                                Counseling given: Not Answered      Vital Signs (Current):   Vitals:    01/20/22 1055 01/20/22 1100 01/20/22 1107   BP:   137/82   Pulse: 84     Resp: 16     Temp: 96.9 °F (36.1 °C)     SpO2: 100%     Weight:  196 lb (88.9 kg)                                               BP Readings from Last 3 Encounters:   01/20/22 137/82   01/10/22 130/84   01/03/22 136/79       NPO Status: Time of last liquid consumption: 2130                        Time of last solid consumption: 1800                        Date of last liquid consumption: 01/19/22                        Date of last solid food consumption: 01/19/22    BMI:   Wt Readings from Last 3 Encounters:   01/20/22 196 lb (88.9 kg)   01/10/22 196 lb (88.9 kg)   01/03/22 196 lb (88.9 kg)     Body mass index is 30.7 kg/m².     CBC:   Lab Results   Component Value Date    WBC 9.9 01/03/2022    RBC 4.14 01/03/2022    HGB 12.7 01/03/2022    HCT 38.8 01/03/2022    MCV 93.7 01/03/2022    RDW 12.0 01/03/2022     01/03/2022       CMP:   Lab Results   Component Value Date     01/03/2022    K 4.4 01/03/2022    CL 97 01/03/2022    CO2 23 01/03/2022    BUN 18 01/03/2022    CREATININE 0.84 01/03/2022    GFRAA >60 01/03/2022    LABGLOM >60 01/03/2022    GLUCOSE 177 01/03/2022    PROT 7.1 07/13/2021    CALCIUM 9.8 01/03/2022    BILITOT 0.55 07/13/2021    ALKPHOS 96 07/13/2021    AST 17 07/13/2021    ALT 19 07/13/2021       POC Tests:   Recent Labs     01/20/22  1108   POCGLU 95       Coags: No results found for: PROTIME, INR, APTT    HCG (If Applicable): No results found for: PREGTESTUR, PREGSERUM, HCG, HCGQUANT     ABGs: No results found for: PHART, PO2ART, EBD0TEO, KDU0HYI, BEART, U9MDTIUQ     Type & Screen (If Applicable):  No results found for: LABABO, LABRH    Drug/Infectious Status (If Applicable):  Lab Results   Component Value Date    HEPCAB NONREACTIVE 02/27/2019       COVID-19 Screening (If Applicable): No results found for: COVID19        Anesthesia Evaluation  Patient summary reviewed and Nursing notes reviewed no history of anesthetic complications:   Airway: Mallampati: II  TM distance: >3 FB   Neck ROM: full  Mouth opening: > = 3 FB Dental: normal exam         Pulmonary:normal exam        (-) COPD and asthma                           Cardiovascular:  Exercise tolerance: good (>4 METS),   (+) hyperlipidemia    (-) past MI, CAD and CABG/stent        Rate: normal           Beta Blocker:  Dose within 24 Hrs         Neuro/Psych:      (-) TIA and CVA           GI/Hepatic/Renal:        (-) GERD       Endo/Other:    (+) Diabetes, . Abdominal:             Vascular: Other Findings:             Anesthesia Plan      general     ASA 3       Induction: intravenous. MIPS: Prophylactic antiemetics administered. Anesthetic plan and risks discussed with patient. Plan discussed with CRNA.     Attending anesthesiologist reviewed and agrees with Preprocedure content              Rachana Obrien DO   1/20/2022

## 2022-01-20 NOTE — OP NOTE
PODIATRY OP NOTE     PATIENT NAME: Jewel Primrose  YOB: 1965  -  60 y.o. female  MRN: [id number]  DATE: 1/20/2022  BILLING #: [id number]     Surgeon(s):  Kalli Crouch DPM      ASSISTANTS: Refugio Spaulding DPM PGY-2     PRE-OP DIAGNOSIS:   1. Painful HAV of left foot. 2. Painful to 2nd MPJ of left foot. 3. 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     PROCEDURE:   1. 1st MPJ arthrodesis, left  2. PIPJ arthrodesis, left foot  3. Shortening metatarsal osteotomy, 2nd metatarsal left foot.     ANESTHESIA: General      HEMOSTASIS: pneumatic ankle tourniquet, left foot     ESTIMATED BLOOD LOSS: Less than 5cc.     MATERIALS:   Implant Name Type Inv. Item Serial No.  Lot No. LRB No. Used Action   SCREW BNE L28MM DIA3MM SHT THRD HD MINI-MONSTER   SCREW BNE L28MM DIA3MM SHT THRD HD MINI-MONSTER   [employer] 28-WD   Left 1 Implanted   SCREW BNE L24MM OD3MM ROBERTA HD SLD SHT THRD MINI-MONSTER   SCREW BNE L24MM OD3MM ROBERTA HD SLD SHT THRD MINI-MONSTER   [employer] 28-WD   Left 1 Implanted   GUIDEWIRE SURG L150MM DIA1. 6MM NICKEL CHROM SMOOTH SGL END   GUIDEWIRE SURG L150MM DIA1. 6MM NICKEL CHROM SMOOTH SGL END   PARAGON 28-WD   Left 1 Implanted and Explanted   K WIRE .062 OR 1.6MM   K WIRE .062 OR 1.6MM   [employer]WD   Left 1 Implanted         INJECTABLES:   Pre-op 10cc 1:1 mix of 0.5% marcaine plain and 1% lidocaine plain  Post-op 10cc 0.5% Marcaine plain.     SPECIMEN:   * No specimens in log *     COMPLICATIONS: none     FINDINGS: see op note for detail     The patient was counseled at length about the risks of valery Covid-19 during their perioperative period and any recovery window from their procedure.  The patient was made aware that valery Covid-19  may worsen their prognosis for recovering from their procedure  and lend to a higher morbidity and/or mortality risk.  All material risks, benefits, and reasonable alternatives including postponing the procedure were discussed. The patient does wish to proceed with the procedure at this time. INDICATION FOR PROCEDURE: Patient has had a painful 1st and 2nd MPJ of the left foot for some time. The patient has failed conservative treatments and elects to undergo surgical correction. Risks and benefits were discussed. Patient previously had significant uncontrolled blood sugar which was checked in the days leading up to the surgery and was found to have been more controlled as of recently. Risks and benefits of undergoing surgery with uncontrolled blood sugar was also explained to the patient who expressed understanding. No guarantees were given or implied. Consent is signed and in the chart. PROCEDURE IN DETAIL: The patient was transported from pre-op to the operating room and placed on the operating table in the supine position with a safety strap across the lap. A pneumatic ankle tourniquet was applied. After adequate sedation by the Anesthesia, a local nerve block of 10cc of 1:1 mixture of 1% lidocaine plain and 0.5% Marcaine plain was injected. The foot was then prepped and draped in the usual aseptic fashion. The foot was then exsanguinated with an Esmarch bandage. The pneumatic ankle tourniquet was inflated to 250 mmHg. Attention was directed to the left hallux. A dorsal medial incision was created over the first metatarsophalangeal joint with a #15 blade. The incision was then deepened. The skin and subcutaneous tissue were then undermined off of the capsule medially. A dorsal linear capsular incision was then created over the first metatarsophalangeal joint and extending distally to the base of the proximal phalanx. The periosteum and capsule were then reflected off of the first metatarsal head and proximal phalanx base. There was noted to be multiple prominent osteophytes about the 1st MTPJ complex. The articular cartilage was poor with central ebrunation noted on the 1st metatarsal head.  Using the sagittal saw and rongeur the prominent osteophytes were meticulously resected from the 1st metatarsal head and proximal phalanx base. Care was taken to preserve the extensor halucis longus tendon. Next the articular cartilage was resected from both the 1st metatarsal head and proximal phalangeal base using cup and cone reamers. Once the subchondral bone plate was exposed the remaining osteophytes were removed from both the metatarsal head and phalangeal base with rongeurs. A copious amount of saline was used to irrigate the surgical site. The joint was then fenestrated using a paragon fenestration drill. Next a 28 mm 3.0 lag screw was inserted from distal medial aspect of the proximal phalanx, to the proximal lateral aspect of 1st metatarsal head ith good purchase across the 1st MTPJ. Next a a 24 mm 3.0 lag screw was inserted from distal lateral of the proximal phalanx to the proximal medial aspect of the 1st metatarsal head. Fluoroscopy was then used to check final alignment and the 1st ray was noted to be in good alignment and rectus. Capsule was then closed using 2-0 Vicryl and the ski nwas closed in layers using 4-0 Monocryl and 4-0 Nylon. Attention was then directed to the 2nd digit where a linear incision was made over the 2nd MPJ and extended to the DIPJ. This was deepened until the extensor tendon and PIPJ were identified. The extensor tend was transected just proximal to the PIPJ and reflected back to the metatarsal. The head of the proximal phalanx was resected using a sagital saw and the base of the middle phalanx was resected using the sagital saw. Next, the 2nd metatarsal was shortened using a weil type osteotomy and was temporarily fixated using a K-wire. Next, a K-wire was inserted retrograde from the 2nd digit to the 2nd metatarsal. The surgical sites were then irrigated with copious amounts of sterile saline. The extensor tendon was repaired using 2-0 Vicryl.  Skin was then closed in layers using 4-0 monocryl and 4-0 nylon suture    Dressings consisted of adapic, 4 x 4s, Kerlix and an ACE bandage. A posterior splint was then applied. The pneumatic ankle tourniquet was released and immediate hyperemic flush was noted to all five digits of the left foot. A short leg posterior splint was then applied. The patient tolerated the above procedure and anesthesia well without complications. The patient was transported from the operating room to the PACU with vital signs stable and vascular status intact to the foot. The patient is to follow up with Dr. Cris Gonzales as scheduled.     Electronically signed by Vangie Stringer DPM on 1/20/2022 at 5:48 PM

## 2022-03-04 RX ORDER — PIOGLITAZONEHYDROCHLORIDE 15 MG/1
TABLET ORAL
Qty: 30 TABLET | Refills: 1 | OUTPATIENT
Start: 2022-03-04

## 2022-04-06 DIAGNOSIS — E11.9 TYPE 2 DIABETES MELLITUS WITHOUT COMPLICATION, WITHOUT LONG-TERM CURRENT USE OF INSULIN (HCC): ICD-10-CM

## 2022-04-06 NOTE — TELEPHONE ENCOUNTER
Last visit: 01/10/2022  Last Med refill: 01/10/2022  Does patient have enough medication for 72 hours: Yes    Next Visit Date:  Future Appointments   Date Time Provider Omar Barrera   4/11/2022  7:45 AM TERA Conway - CNP [facility] [address] Maintenance   Topic Date Due    COVID-19 Vaccine (1) Never done    Hepatitis B vaccine (1 of 3 - Risk 3-dose series) Never done    Cervical cancer screen  Never done    Shingles Vaccine (1 of 2) Never done    Diabetic foot exam  02/25/2020    Flu vaccine (Season Ended) 10/18/2022 (Originally 9/1/2022)    Pneumococcal 0-64 years Vaccine (1 of 2 - PPSV23) 10/18/2022 (Originally 9/21/1969)    Lipid screen  07/13/2022    Depression Screen  07/13/2022    Diabetic microalbuminuria test  10/18/2022    A1C test (Diabetic or Prediabetic)  01/03/2023    Diabetic retinal exam  05/17/2023    Breast cancer screen  10/19/2023    Colorectal Cancer Screen  07/01/2024    DTaP/Tdap/Td vaccine (3 - Td or Tdap) 01/28/2030    Hepatitis C screen  Completed    HIV screen  Completed    Hepatitis A vaccine  Aged Out    Hib vaccine  Aged Out    Meningococcal (ACWY) vaccine  Aged Out       Hemoglobin A1C (%)   Date Value   01/03/2022 8.6 (H)   10/18/2021 8.3   07/13/2021 8.1             ( goal A1C is < 7)   Microalb/Crt.  Ratio (mcg/mg creat)   Date Value   08/26/2015 12     LDL Cholesterol (mg/dL)   Date Value   07/13/2021 108   02/27/2019 100       (goal LDL is <100)   AST (U/L)   Date Value   07/13/2021 17     ALT (U/L)   Date Value   07/13/2021 19     BUN (mg/dL)   Date Value   01/03/2022 18     BP Readings from Last 3 Encounters:   01/20/22 (!) 146/85   01/20/22 (!) 87/55   01/10/22 130/84          (goal 120/80)    All Future Testing planned in [facility]  Lab Frequency Next Occurrence               Patient Active Problem List:     Metatarsalgia of both feet     Diabetes mellitus ([address]     Mixed hyperlipidemia     Abnormal cervical Papanicolaou smear

## 2022-04-08 RX ORDER — GLIMEPIRIDE 2 MG/1
2 TABLET ORAL EVERY MORNING
Qty: 30 TABLET | Refills: 2 | Status: SHIPPED | OUTPATIENT
Start: 2022-04-08 | End: 2022-07-05

## 2022-04-11 ENCOUNTER — OFFICE VISIT (OUTPATIENT)
Dept: FAMILY MEDICINE CLINIC | Age: 57
End: 2022-04-11
Payer: COMMERCIAL

## 2022-04-11 VITALS
HEART RATE: 73 BPM | WEIGHT: 198.8 LBS | SYSTOLIC BLOOD PRESSURE: 120 MMHG | DIASTOLIC BLOOD PRESSURE: 80 MMHG | OXYGEN SATURATION: 98 % | BODY MASS INDEX: 31.14 KG/M2

## 2022-04-11 DIAGNOSIS — E11.9 TYPE 2 DIABETES MELLITUS WITHOUT COMPLICATION, WITHOUT LONG-TERM CURRENT USE OF INSULIN (HCC): Primary | ICD-10-CM

## 2022-04-11 LAB — HBA1C MFR BLD: 7.2 %

## 2022-04-11 PROCEDURE — 3051F HG A1C>EQUAL 7.0%<8.0%: CPT | Performed by: NURSE PRACTITIONER

## 2022-04-11 PROCEDURE — 83036 HEMOGLOBIN GLYCOSYLATED A1C: CPT | Performed by: NURSE PRACTITIONER

## 2022-04-11 PROCEDURE — 99213 OFFICE O/P EST LOW 20 MIN: CPT | Performed by: NURSE PRACTITIONER

## 2022-04-11 RX ORDER — TERBINAFINE HYDROCHLORIDE 250 MG/1
TABLET ORAL
COMMUNITY
Start: 2022-03-15

## 2022-04-11 ASSESSMENT — PATIENT HEALTH QUESTIONNAIRE - PHQ9
SUM OF ALL RESPONSES TO PHQ QUESTIONS 1-9: 0
7. TROUBLE CONCENTRATING ON THINGS, SUCH AS READING THE NEWSPAPER OR WATCHING TELEVISION: 0
9. THOUGHTS THAT YOU WOULD BE BETTER OFF DEAD, OR OF HURTING YOURSELF: 0
5. POOR APPETITE OR OVEREATING: 0
2. FEELING DOWN, DEPRESSED OR HOPELESS: 0
SUM OF ALL RESPONSES TO PHQ9 QUESTIONS 1 & 2: 0
10. IF YOU CHECKED OFF ANY PROBLEMS, HOW DIFFICULT HAVE THESE PROBLEMS MADE IT FOR YOU TO DO YOUR WORK, TAKE CARE OF THINGS AT HOME, OR GET ALONG WITH OTHER PEOPLE: 0
6. FEELING BAD ABOUT YOURSELF - OR THAT YOU ARE A FAILURE OR HAVE LET YOURSELF OR YOUR FAMILY DOWN: 0
SUM OF ALL RESPONSES TO PHQ QUESTIONS 1-9: 0
SUM OF ALL RESPONSES TO PHQ QUESTIONS 1-9: 0
8. MOVING OR SPEAKING SO SLOWLY THAT OTHER PEOPLE COULD HAVE NOTICED. OR THE OPPOSITE, BEING SO FIGETY OR RESTLESS THAT YOU HAVE BEEN MOVING AROUND A LOT MORE THAN USUAL: 0
1. LITTLE INTEREST OR PLEASURE IN DOING THINGS: 0
3. TROUBLE FALLING OR STAYING ASLEEP: 0
4. FEELING TIRED OR HAVING LITTLE ENERGY: 0
SUM OF ALL RESPONSES TO PHQ QUESTIONS 1-9: 0

## 2022-04-11 ASSESSMENT — ENCOUNTER SYMPTOMS
SHORTNESS OF BREATH: 0
COUGH: 0

## 2022-04-11 NOTE — PROGRESS NOTES
Patient is present for 3 month f/u for DM  States her sugars were running 100-105, states in the last month they have been 130-150

## 2022-04-11 NOTE — PROGRESS NOTES
Nini Garcia (:  1965) is a 60 y.o. female,Established patient, here for evaluation of the following chief complaint(s):  Diabetes         ASSESSMENT/PLAN:  1. Type 2 diabetes mellitus without complication, without long-term current use of insulin ([facility])  -     POCT glycosylated hemoglobin (Hb A1C)  rto in 3 months or prn    Return in about 3 months (around 2022). Subjective   SUBJECTIVE/OBJECTIVE:  HPI  Dm- a1c in October was 8.3- she was switched to glimperide and her a1c today is 7.2. she has noticed elevated fbs since starting lamisil. She is getting more active, back to work, diet stable. Will continue to monitor, if no improvement will notify me. Review of Systems   Constitutional: Negative for chills and fever. Respiratory: Negative for cough and shortness of breath. Cardiovascular: Negative for chest pain, palpitations and leg swelling. Objective      Wt Readings from Last 3 Encounters:   22 198 lb 12.8 oz (90.2 kg)   22 196 lb (88.9 kg)   01/10/22 196 lb (88.9 kg)     Vitals:    22 0739   BP: 120/80   Pulse: 73   SpO2: 98%         Physical Exam  Constitutional:       Appearance: She is well-developed. HENT:      Right Ear: External ear normal.      Left Ear: External ear normal.      Nose: Nose normal.   Cardiovascular:      Rate and Rhythm: Normal rate and regular rhythm. Heart sounds: Normal heart sounds, S1 normal and S2 normal.   Pulmonary:      Effort: Pulmonary effort is normal. No respiratory distress. Breath sounds: Normal breath sounds. Musculoskeletal:         General: No deformity. Normal range of motion. Cervical back: Full passive range of motion without pain and normal range of motion. Skin:     General: Skin is warm and dry. Neurological:      Mental Status: She is alert and oriented to person, place, and time. An electronic signature was used to authenticate this note.     --Kassandra Corea, APRN - CNP

## 2022-07-03 DIAGNOSIS — E11.9 TYPE 2 DIABETES MELLITUS WITHOUT COMPLICATION, WITHOUT LONG-TERM CURRENT USE OF INSULIN (HCC): ICD-10-CM

## 2022-07-05 RX ORDER — GLIMEPIRIDE 2 MG/1
2 TABLET ORAL EVERY MORNING
Qty: 30 TABLET | Refills: 2 | Status: SHIPPED | OUTPATIENT
Start: 2022-07-05 | End: 2022-07-12 | Stop reason: SDUPTHER

## 2022-07-05 NOTE — TELEPHONE ENCOUNTER
Last visit: 04/11/2022  Last Med refill: 04/08/2022  Does patient have enough medication for 72 hours: Yes    Next Visit Date:  Future Appointments   Date Time Provider Omar Barrera   7/12/2022  8:00 AM Parnell Shone, APRN - CNP [facility] [address] Maintenance   Topic Date Due    COVID-19 Vaccine (1) Never done    Hepatitis B vaccine (1 of 3 - Risk 3-dose series) Never done    Cervical cancer screen  Never done    Shingles vaccine (1 of 2) Never done    Diabetic foot exam  02/25/2020    Diabetic retinal exam  05/17/2022    Lipids  07/13/2022    Pneumococcal 0-64 years Vaccine (1 - PCV) 10/18/2022 (Originally 9/21/1969)    Flu vaccine (1) 09/01/2022    Diabetic microalbuminuria test  10/18/2022    A1C test (Diabetic or Prediabetic)  04/11/2023    Depression Screen  04/11/2023    Breast cancer screen  10/19/2023    Colorectal Cancer Screen  07/01/2024    DTaP/Tdap/Td vaccine (3 - Td or Tdap) 01/28/2030    Hepatitis C screen  Completed    HIV screen  Completed    Hepatitis A vaccine  Aged Out    Hib vaccine  Aged Out    Meningococcal (ACWY) vaccine  Aged Out       Hemoglobin A1C (%)   Date Value   04/11/2022 7.2   01/03/2022 8.6 (H)   10/18/2021 8.3             ( goal A1C is < 7)   Microalb/Crt.  Ratio (mcg/mg creat)   Date Value   08/26/2015 12     LDL Cholesterol (mg/dL)   Date Value   07/13/2021 108   02/27/2019 100       (goal LDL is <100)   AST (U/L)   Date Value   07/13/2021 17     ALT (U/L)   Date Value   07/13/2021 19     BUN (mg/dL)   Date Value   01/03/2022 18     BP Readings from Last 3 Encounters:   04/11/22 120/80   01/20/22 (!) 146/85   01/20/22 (!) 87/55          (goal 120/80)    All Future Testing planned in [facility]  Lab Frequency Next Occurrence               Patient Active Problem List:     Metatarsalgia of both feet     Diabetes mellitus ([address]     Mixed hyperlipidemia     Abnormal cervical Papanicolaou smear

## 2022-07-08 ENCOUNTER — NURSE ONLY (OUTPATIENT)
Dept: FAMILY MEDICINE CLINIC | Age: 57
End: 2022-07-08
Payer: COMMERCIAL

## 2022-07-08 ENCOUNTER — TELEPHONE (OUTPATIENT)
Dept: FAMILY MEDICINE CLINIC | Age: 57
End: 2022-07-08

## 2022-07-08 DIAGNOSIS — R31.9 HEMATURIA, UNSPECIFIED TYPE: Primary | ICD-10-CM

## 2022-07-08 LAB
BILIRUBIN, POC: ABNORMAL
BLOOD URINE, POC: ABNORMAL
CLARITY, POC: ABNORMAL
COLOR, POC: YELLOW
GLUCOSE URINE, POC: 250
KETONES, POC: ABNORMAL
LEUKOCYTE EST, POC: ABNORMAL
NITRITE, POC: ABNORMAL
PH, POC: 5
PROTEIN, POC: 100
SPECIFIC GRAVITY, POC: 1.01
UROBILINOGEN, POC: 0.2

## 2022-07-08 PROCEDURE — 81002 URINALYSIS NONAUTO W/O SCOPE: CPT | Performed by: NURSE PRACTITIONER

## 2022-07-08 RX ORDER — NITROFURANTOIN 25; 75 MG/1; MG/1
100 CAPSULE ORAL 2 TIMES DAILY
Qty: 20 CAPSULE | Refills: 0 | Status: SHIPPED | OUTPATIENT
Start: 2022-07-08 | End: 2022-07-18

## 2022-07-08 NOTE — TELEPHONE ENCOUNTER
Pt called stating she has a UTI, started a couple days ago, is now to the point there is blood in her urine.  States she gets these often, is willing to stop by and give a urine sample if needed     States she knows she has an appt on 07/12/2022 but don't think she can wait til then

## 2022-07-10 DIAGNOSIS — E11.9 TYPE 2 DIABETES MELLITUS WITHOUT COMPLICATION, WITHOUT LONG-TERM CURRENT USE OF INSULIN (HCC): ICD-10-CM

## 2022-07-11 NOTE — TELEPHONE ENCOUNTER
Last visit: 4/11/22  Last Med refill: 1/12/22  Does patient have enough medication for 72 hours: No:     Next Visit Date:  Future Appointments   Date Time Provider Omar Barrera   7/12/2022  8:00 AM TERA Johnston CNP [facility] [address] Maintenance   Topic Date Due    COVID-19 Vaccine (1) Never done    Hepatitis B vaccine (1 of 3 - Risk 3-dose series) Never done    Cervical cancer screen  Never done    Shingles vaccine (1 of 2) Never done    Diabetic foot exam  02/25/2020    Diabetic retinal exam  05/17/2022    Lipids  07/13/2022    Pneumococcal 0-64 years Vaccine (1 - PCV) 10/18/2022 (Originally 9/21/1969)    Flu vaccine (1) 09/01/2022    Diabetic microalbuminuria test  10/18/2022    A1C test (Diabetic or Prediabetic)  04/11/2023    Depression Screen  04/11/2023    Breast cancer screen  10/19/2023    Colorectal Cancer Screen  07/01/2024    DTaP/Tdap/Td vaccine (3 - Td or Tdap) 01/28/2030    Hepatitis C screen  Completed    HIV screen  Completed    Hepatitis A vaccine  Aged Out    Hib vaccine  Aged Out    Meningococcal (ACWY) vaccine  Aged Out       Hemoglobin A1C (%)   Date Value   04/11/2022 7.2   01/03/2022 8.6 (H)   10/18/2021 8.3             ( goal A1C is < 7)   Microalb/Crt.  Ratio (mcg/mg creat)   Date Value   08/26/2015 12     LDL Cholesterol (mg/dL)   Date Value   07/13/2021 108   02/27/2019 100       (goal LDL is <100)   AST (U/L)   Date Value   07/13/2021 17     ALT (U/L)   Date Value   07/13/2021 19     BUN (mg/dL)   Date Value   01/03/2022 18     BP Readings from Last 3 Encounters:   04/11/22 120/80   01/20/22 (!) 146/85   01/20/22 (!) 87/55          (goal 120/80)    All Future Testing planned in [facility]  Lab Frequency Next Occurrence               Patient Active Problem List:     Metatarsalgia of both feet     Diabetes mellitus ([address]     Mixed hyperlipidemia     Abnormal cervical Papanicolaou smear

## 2022-07-12 ENCOUNTER — OFFICE VISIT (OUTPATIENT)
Dept: FAMILY MEDICINE CLINIC | Age: 57
End: 2022-07-12
Payer: COMMERCIAL

## 2022-07-12 VITALS
OXYGEN SATURATION: 97 % | SYSTOLIC BLOOD PRESSURE: 110 MMHG | BODY MASS INDEX: 30.7 KG/M2 | WEIGHT: 196 LBS | HEART RATE: 79 BPM | DIASTOLIC BLOOD PRESSURE: 74 MMHG

## 2022-07-12 DIAGNOSIS — E11.9 TYPE 2 DIABETES MELLITUS WITHOUT COMPLICATION, WITHOUT LONG-TERM CURRENT USE OF INSULIN (HCC): Primary | ICD-10-CM

## 2022-07-12 LAB — HBA1C MFR BLD: 8.4 %

## 2022-07-12 PROCEDURE — 99213 OFFICE O/P EST LOW 20 MIN: CPT | Performed by: NURSE PRACTITIONER

## 2022-07-12 PROCEDURE — 3052F HG A1C>EQUAL 8.0%<EQUAL 9.0%: CPT | Performed by: NURSE PRACTITIONER

## 2022-07-12 PROCEDURE — 83036 HEMOGLOBIN GLYCOSYLATED A1C: CPT | Performed by: NURSE PRACTITIONER

## 2022-07-12 RX ORDER — ATORVASTATIN CALCIUM 40 MG/1
TABLET, FILM COATED ORAL
Qty: 30 TABLET | Refills: 5 | Status: SHIPPED | OUTPATIENT
Start: 2022-07-12

## 2022-07-12 RX ORDER — GLIMEPIRIDE 1 MG/1
3 TABLET ORAL EVERY MORNING
Qty: 90 TABLET | Refills: 5 | Status: SHIPPED | OUTPATIENT
Start: 2022-07-12

## 2022-07-12 ASSESSMENT — ENCOUNTER SYMPTOMS
SHORTNESS OF BREATH: 0
COUGH: 0

## 2022-07-12 NOTE — PROGRESS NOTES
Patient is present for 3 month f/u for DM  States she checks her sugars in the morning and it is usually 110-170

## 2022-07-12 NOTE — PROGRESS NOTES
Murray Galeazzi (:  1965) is a 60 y.o. female,Established patient, here for evaluation of the following chief complaint(s):  Diabetes and 3 Month Follow-Up         ASSESSMENT/PLAN:  1. Type 2 diabetes mellitus without complication, without long-term current use of insulin ([facility])  -     POCT glycosylated hemoglobin (Hb A1C)  The following orders have not been finalized:  -     metFORMIN (GLUCOPHAGE) 1000 MG tablet  -     atorvastatin (LIPITOR) 40 MG tablet    Increase glimepride to 3 mg  No follow-ups on file. Subjective   SUBJECTIVE/OBJECTIVE:  HPI   Dm- a1c today is 8.4- not exercising right now. Review of Systems   Constitutional: Negative for chills and fever. Respiratory: Negative for cough and shortness of breath. Cardiovascular: Negative for chest pain, palpitations and leg swelling. Objective    Vitals:    22 0755   BP: 110/74   Pulse: 79   SpO2: 97%     Wt Readings from Last 3 Encounters:   22 196 lb (88.9 kg)   22 198 lb 12.8 oz (90.2 kg)   22 196 lb (88.9 kg)       Physical Exam  Constitutional:       Appearance: She is well-developed. HENT:      Right Ear: External ear normal.      Left Ear: External ear normal.      Nose: Nose normal.   Cardiovascular:      Rate and Rhythm: Normal rate and regular rhythm. Heart sounds: Normal heart sounds, S1 normal and S2 normal.   Pulmonary:      Effort: Pulmonary effort is normal. No respiratory distress. Breath sounds: Normal breath sounds. Musculoskeletal:         General: No deformity. Normal range of motion. Cervical back: Full passive range of motion without pain and normal range of motion. Skin:     General: Skin is warm and dry. Neurological:      Mental Status: She is alert and oriented to person, place, and time. An electronic signature was used to authenticate this note.     --Romeo Hilario, APRN - CNP

## 2022-10-07 ENCOUNTER — TELEPHONE (OUTPATIENT)
Dept: GASTROENTEROLOGY | Age: 57
End: 2022-10-07

## 2022-10-11 NOTE — TELEPHONE ENCOUNTER
LVM to call the office to schedule. CKD-no  BT- no  Diabetes - yes  Rx-  Last colonoscopy it was mentioned diminutive polyp.

## 2022-10-14 ENCOUNTER — OFFICE VISIT (OUTPATIENT)
Dept: FAMILY MEDICINE CLINIC | Age: 57
End: 2022-10-14
Payer: COMMERCIAL

## 2022-10-14 VITALS
OXYGEN SATURATION: 96 % | WEIGHT: 199 LBS | DIASTOLIC BLOOD PRESSURE: 70 MMHG | BODY MASS INDEX: 31.17 KG/M2 | SYSTOLIC BLOOD PRESSURE: 124 MMHG | HEART RATE: 106 BPM

## 2022-10-14 DIAGNOSIS — Z87.42 HISTORY OF ABNORMAL CERVICAL PAP SMEAR: ICD-10-CM

## 2022-10-14 DIAGNOSIS — E11.9 TYPE 2 DIABETES MELLITUS WITHOUT COMPLICATION, WITHOUT LONG-TERM CURRENT USE OF INSULIN (HCC): Primary | ICD-10-CM

## 2022-10-14 DIAGNOSIS — Z23 NEED FOR INFLUENZA VACCINATION: ICD-10-CM

## 2022-10-14 LAB
CREATININE URINE POCT: 100
HBA1C MFR BLD: 7.5 %
MICROALBUMIN/CREAT 24H UR: 10 MG/G{CREAT}
MICROALBUMIN/CREAT UR-RTO: <30

## 2022-10-14 PROCEDURE — 90471 IMMUNIZATION ADMIN: CPT | Performed by: NURSE PRACTITIONER

## 2022-10-14 PROCEDURE — 82044 UR ALBUMIN SEMIQUANTITATIVE: CPT | Performed by: NURSE PRACTITIONER

## 2022-10-14 PROCEDURE — 90674 CCIIV4 VAC NO PRSV 0.5 ML IM: CPT | Performed by: NURSE PRACTITIONER

## 2022-10-14 PROCEDURE — 3051F HG A1C>EQUAL 7.0%<8.0%: CPT | Performed by: NURSE PRACTITIONER

## 2022-10-14 PROCEDURE — 83036 HEMOGLOBIN GLYCOSYLATED A1C: CPT | Performed by: NURSE PRACTITIONER

## 2022-10-14 PROCEDURE — 99213 OFFICE O/P EST LOW 20 MIN: CPT | Performed by: NURSE PRACTITIONER

## 2022-10-14 RX ORDER — FLASH GLUCOSE SCANNING READER
1 EACH MISCELLANEOUS ONCE
Qty: 1 EACH | Refills: 0 | Status: SHIPPED | OUTPATIENT
Start: 2022-10-14 | End: 2022-10-14

## 2022-10-14 RX ORDER — FLASH GLUCOSE SENSOR
1 KIT MISCELLANEOUS
Qty: 2 EACH | Refills: 5 | Status: SHIPPED | OUTPATIENT
Start: 2022-10-14

## 2022-10-14 ASSESSMENT — ENCOUNTER SYMPTOMS
COUGH: 0
SHORTNESS OF BREATH: 0

## 2022-10-14 NOTE — PROGRESS NOTES
Betsy Peter (:  1965) is a 59 y.o. female,Established patient, here for evaluation of the following chief complaint(s):  Diabetes and 3 Month Follow-Up         ASSESSMENT/PLAN:  1. Type 2 diabetes mellitus without complication, without long-term current use of insulin (HCC)  -     POCT glycosylated hemoglobin (Hb A1C)  -     POCT microalbumin  2. Need for influenza vaccination  -     Influenza, FLUCELVAX, (age 10 mo+), IM, Preservative Free, 0.5 mL  3. History of abnormal cervical Pap smear  -     Coco Casarez File, DO, OB/GYN, [place]      No follow-ups on file. Subjective   SUBJECTIVE/OBJECTIVE:  HPI  Last dm was July- a1c was 8.4- glimperide was increased. A1c today is 7.5- trying to exercise more. Sees podiatry- sees eye doc annually-     Would like referral to see ob/gyn    Review of Systems   Constitutional:  Negative for chills and fever. Respiratory:  Negative for cough and shortness of breath. Cardiovascular:  Negative for chest pain, palpitations and leg swelling. Objective   Vitals:    10/14/22 0733   BP: 124/70   Pulse: (!) 106   SpO2: 96%     Wt Readings from Last 3 Encounters:   10/14/22 199 lb (90.3 kg)   22 196 lb (88.9 kg)   22 198 lb 12.8 oz (90.2 kg)       Physical Exam  Constitutional:       Appearance: She is well-developed. HENT:      Right Ear: External ear normal.      Left Ear: External ear normal.      Nose: Nose normal.   Cardiovascular:      Rate and Rhythm: Normal rate and regular rhythm. Heart sounds: Normal heart sounds, S1 normal and S2 normal.   Pulmonary:      Effort: Pulmonary effort is normal. No respiratory distress. Breath sounds: Normal breath sounds. Musculoskeletal:         General: No deformity. Normal range of motion. Cervical back: Full passive range of motion without pain and normal range of motion. Skin:     General: Skin is warm and dry.    Neurological:      Mental Status: She is alert and oriented to person, place, and time. An electronic signature was used to authenticate this note.     --TERA Irvin - CNP

## 2022-12-20 ENCOUNTER — TELEPHONE (OUTPATIENT)
Dept: FAMILY MEDICINE CLINIC | Age: 57
End: 2022-12-20

## 2022-12-20 DIAGNOSIS — E87.5 HYPERKALEMIA: Primary | ICD-10-CM

## 2022-12-22 ENCOUNTER — HOSPITAL ENCOUNTER (OUTPATIENT)
Age: 57
Setting detail: SPECIMEN
Discharge: HOME | End: 2022-12-22

## 2022-12-22 DIAGNOSIS — E87.5 HYPERKALEMIA: ICD-10-CM

## 2022-12-22 LAB
ANION GAP SERPL CALCULATED.3IONS-SCNC: 12 MMOL/L (ref 9–17)
BUN BLDV-MCNC: 11 MG/DL (ref 6–20)
CALCIUM SERPL-MCNC: 9.1 MG/DL (ref 8.6–10.4)
CHLORIDE BLD-SCNC: 103 MMOL/L (ref 98–107)
CO2: 26 MMOL/L (ref 20–31)
CREAT SERPL-MCNC: 0.81 MG/DL (ref 0.5–0.9)
GFR SERPL CREATININE-BSD FRML MDRD: >60 ML/MIN/1.73M2
GLUCOSE BLD-MCNC: 137 MG/DL (ref 70–99)
POTASSIUM SERPL-SCNC: 4.9 MMOL/L (ref 3.7–5.3)
SODIUM BLD-SCNC: 141 MMOL/L (ref 135–144)

## 2022-12-22 NOTE — TELEPHONE ENCOUNTER
Left message for a call back in regards to lab results.  I have mailed lab order to her (in case she wants done at different lab)

## 2022-12-27 DIAGNOSIS — E11.9 TYPE 2 DIABETES MELLITUS WITHOUT COMPLICATION, WITHOUT LONG-TERM CURRENT USE OF INSULIN (HCC): ICD-10-CM

## 2022-12-27 NOTE — TELEPHONE ENCOUNTER
Last visit: 10/14/22  Last Med refill: 7/12/22  Does patient have enough medication for 72 hours: Yes    Next Visit Date:  Future Appointments   Date Time Provider Omar Barrera   1/16/2023  7[facility]   1/25/2023  9:30 AM DO Gumaro [facility]       Health Maintenance   Topic Date Due    Pneumococcal 0-64 years Vaccine (1 - PCV) Never done    GFR test (Diabetes, CKD 3-4, OR last GFR 15-59)  Never done    Hepatitis B vaccine (1 of 3 - Risk 3-dose series) Never done    Cervical cancer screen  Never done    Shingles vaccine (1 of 2) Never done    Diabetic foot exam  02/25/2020    Diabetic retinal exam  05/17/2022    Lipids  07/13/2022    COVID-19 Vaccine (1) 07/12/2023 (Originally 3/21/1966)    Depression Screen  04/11/2023    A1C test (Diabetic or Prediabetic)  10/14/2023    Diabetic Alb to Cr ratio (uACR) test  10/14/2023    Breast cancer screen  10/19/2023    Colorectal Cancer Screen  07/01/2024    DTaP/Tdap/Td vaccine (3 - Td or Tdap) 01/28/2030    Flu vaccine  Completed    Hepatitis C screen  Completed    HIV screen  Completed    Hepatitis A vaccine  Aged Out    Hib vaccine  Aged Out    Meningococcal (ACWY) vaccine  Aged Out       Hemoglobin A1C (%)   Date Value   10/14/2022 7.5   07/12/2022 8.4   04/11/2022 7.2             ( goal A1C is < 7)   Microalb/Crt.  Ratio (mcg/mg creat)   Date Value   08/26/2015 12     LDL Cholesterol (mg/dL)   Date Value   07/13/2021 108   02/27/2019 100       (goal LDL is <100)   AST (U/L)   Date Value   07/13/2021 17     ALT (U/L)   Date Value   07/13/2021 19     BUN (mg/dL)   Date Value   12/22/2022 11     BP Readings from Last 3 Encounters:   10/14/22 124/70   07/12/22 110/74   04/11/22 120/80          (goal 120/80)    All Future Testing planned in [facility]  Lab Frequency Next Occurrence               Patient Active Problem List:     Metatarsalgia of both feet     Diabetes mellitus ([facility] [address]     Mixed hyperlipidemia     Abnormal cervical Papanicolaou smear     History of abnormal cervical Pap smear

## 2022-12-30 RX ORDER — GLIMEPIRIDE 1 MG/1
3 TABLET ORAL EVERY MORNING
Qty: 90 TABLET | Refills: 1 | Status: SHIPPED | OUTPATIENT
Start: 2022-12-30 | End: 2023-01-16 | Stop reason: DRUGHIGH

## 2023-01-16 ENCOUNTER — OFFICE VISIT (OUTPATIENT)
Dept: FAMILY MEDICINE CLINIC | Age: 58
End: 2023-01-16
Payer: COMMERCIAL

## 2023-01-16 VITALS
SYSTOLIC BLOOD PRESSURE: 110 MMHG | BODY MASS INDEX: 30.76 KG/M2 | HEIGHT: 67 IN | WEIGHT: 196 LBS | OXYGEN SATURATION: 96 % | DIASTOLIC BLOOD PRESSURE: 66 MMHG | HEART RATE: 84 BPM

## 2023-01-16 DIAGNOSIS — E11.9 TYPE 2 DIABETES MELLITUS WITHOUT COMPLICATION, WITHOUT LONG-TERM CURRENT USE OF INSULIN (HCC): Primary | ICD-10-CM

## 2023-01-16 LAB — HBA1C MFR BLD: 5.5 %

## 2023-01-16 PROCEDURE — 3044F HG A1C LEVEL LT 7.0%: CPT | Performed by: NURSE PRACTITIONER

## 2023-01-16 PROCEDURE — 83036 HEMOGLOBIN GLYCOSYLATED A1C: CPT | Performed by: NURSE PRACTITIONER

## 2023-01-16 PROCEDURE — 99214 OFFICE O/P EST MOD 30 MIN: CPT | Performed by: NURSE PRACTITIONER

## 2023-01-16 RX ORDER — ATORVASTATIN CALCIUM 40 MG/1
TABLET, FILM COATED ORAL
Qty: 90 TABLET | Refills: 1 | Status: SHIPPED | OUTPATIENT
Start: 2023-01-16

## 2023-01-16 RX ORDER — GLIMEPIRIDE 1 MG/1
2 TABLET ORAL EVERY MORNING
Qty: 180 TABLET | Refills: 1 | Status: SHIPPED | OUTPATIENT
Start: 2023-01-16

## 2023-01-16 SDOH — ECONOMIC STABILITY: FOOD INSECURITY: WITHIN THE PAST 12 MONTHS, YOU WORRIED THAT YOUR FOOD WOULD RUN OUT BEFORE YOU GOT MONEY TO BUY MORE.: NEVER TRUE

## 2023-01-16 SDOH — ECONOMIC STABILITY: FOOD INSECURITY: WITHIN THE PAST 12 MONTHS, THE FOOD YOU BOUGHT JUST DIDN'T LAST AND YOU DIDN'T HAVE MONEY TO GET MORE.: NEVER TRUE

## 2023-01-16 ASSESSMENT — PATIENT HEALTH QUESTIONNAIRE - PHQ9
SUM OF ALL RESPONSES TO PHQ QUESTIONS 1-9: 0
SUM OF ALL RESPONSES TO PHQ QUESTIONS 1-9: 0
2. FEELING DOWN, DEPRESSED OR HOPELESS: 0
SUM OF ALL RESPONSES TO PHQ QUESTIONS 1-9: 0
1. LITTLE INTEREST OR PLEASURE IN DOING THINGS: 0
SUM OF ALL RESPONSES TO PHQ QUESTIONS 1-9: 0
SUM OF ALL RESPONSES TO PHQ9 QUESTIONS 1 & 2: 0

## 2023-01-16 ASSESSMENT — ENCOUNTER SYMPTOMS
SHORTNESS OF BREATH: 0
COUGH: 0

## 2023-01-16 ASSESSMENT — SOCIAL DETERMINANTS OF HEALTH (SDOH): HOW HARD IS IT FOR YOU TO PAY FOR THE VERY BASICS LIKE FOOD, HOUSING, MEDICAL CARE, AND HEATING?: NOT HARD AT ALL

## 2023-01-16 NOTE — PROGRESS NOTES
Lisseth Islas (:  1965) is a 59 y.o. female,Established patient, here for evaluation of the following chief complaint(s):  Diabetes and 3 Month Follow-Up      ASSESSMENT/PLAN:  1. Type 2 diabetes mellitus without complication, without long-term current use of insulin (HCC)  -     POCT glycosylated hemoglobin (Hb A1C)  -     metFORMIN (GLUCOPHAGE) 1000 MG tablet; take 1 tablet by mouth twice a day with food, Disp-180 tablet, R-1Normal  -     atorvastatin (LIPITOR) 40 MG tablet; take 1 tablet by mouth once daily, Disp-90 tablet, R-1Normal  -     glimepiride (AMARYL) 1 MG tablet; Take 2 tablets by mouth every morning, Disp-180 tablet, R-1Normal  -     CBC; Future  -     TSH; Future  -     Lipid Panel; Future      No follow-ups on file. Subjective   SUBJECTIVE/OBJECTIVE:  HPI    Dm- up to date on eye exam. A1c is 5.5- using freestyle janet. Reminded about pap apt . Will get order for mammo. Review of Systems   Constitutional:  Negative for chills and fever. Respiratory:  Negative for cough and shortness of breath. Cardiovascular:  Negative for chest pain, palpitations and leg swelling. Objective   Vitals:    23 0729   BP: 110/66   Pulse: 84   SpO2: 96%     Wt Readings from Last 3 Encounters:   23 196 lb (88.9 kg)   10/14/22 199 lb (90.3 kg)   22 196 lb (88.9 kg)       Physical Exam  Constitutional:       Appearance: She is well-developed. HENT:      Right Ear: External ear normal.      Left Ear: External ear normal.      Nose: Nose normal.   Cardiovascular:      Rate and Rhythm: Normal rate and regular rhythm. Heart sounds: Normal heart sounds, S1 normal and S2 normal.   Pulmonary:      Effort: Pulmonary effort is normal. No respiratory distress. Breath sounds: Normal breath sounds. Musculoskeletal:         General: No deformity. Normal range of motion. Cervical back: Full passive range of motion without pain and normal range of motion. Skin:     General: Skin is warm and dry. Neurological:      Mental Status: She is alert and oriented to person, place, and time. An electronic signature was used to authenticate this note.     --TERA Oconnor - CNP

## 2023-01-23 ENCOUNTER — HOSPITAL ENCOUNTER (OUTPATIENT)
Age: 58
Setting detail: SPECIMEN
Discharge: HOME | End: 2023-01-23

## 2023-01-23 DIAGNOSIS — E11.9 TYPE 2 DIABETES MELLITUS WITHOUT COMPLICATION, WITHOUT LONG-TERM CURRENT USE OF INSULIN (HCC): ICD-10-CM

## 2023-01-23 LAB
HCT VFR BLD CALC: 39.8 % (ref 36.3–47.1)
HEMOGLOBIN: 12.6 G/DL (ref 11.9–15.1)
MCH RBC QN AUTO: 31.2 PG (ref 25.2–33.5)
MCHC RBC AUTO-ENTMCNC: 31.7 G/DL (ref 28.4–34.8)
MCV RBC AUTO: 98.5 FL (ref 82.6–102.9)
NRBC AUTOMATED: 0 PER 100 WBC
PDW BLD-RTO: 12.7 % (ref 11.8–14.4)
PLATELET # BLD: 349 K/UL (ref 138–453)
PMV BLD AUTO: 10.5 FL (ref 8.1–13.5)
RBC # BLD: 4.04 M/UL (ref 3.95–5.11)
WBC # BLD: 9 K/UL (ref 3.5–11.3)

## 2023-01-24 LAB
CHOLESTEROL/HDL RATIO: 2.7
CHOLESTEROL: 184 MG/DL
HDLC SERPL-MCNC: 67 MG/DL
LDL CHOLESTEROL: 95 MG/DL (ref 0–130)
TRIGL SERPL-MCNC: 112 MG/DL
TSH SERPL DL<=0.05 MIU/L-ACNC: 3.34 UIU/ML (ref 0.3–5)

## 2023-01-25 ENCOUNTER — HOSPITAL ENCOUNTER (OUTPATIENT)
Age: 58
Setting detail: SPECIMEN
Discharge: HOME | End: 2023-01-25

## 2023-01-25 ENCOUNTER — OFFICE VISIT (OUTPATIENT)
Dept: OBGYN CLINIC | Age: 58
End: 2023-01-25
Payer: COMMERCIAL

## 2023-01-25 VITALS
DIASTOLIC BLOOD PRESSURE: 84 MMHG | BODY MASS INDEX: 30.73 KG/M2 | WEIGHT: 196.2 LBS | SYSTOLIC BLOOD PRESSURE: 139 MMHG | HEART RATE: 79 BPM

## 2023-01-25 DIAGNOSIS — Z12.31 SCREENING MAMMOGRAM FOR BREAST CANCER: ICD-10-CM

## 2023-01-25 DIAGNOSIS — Z01.419 WELL WOMAN EXAM WITH ROUTINE GYNECOLOGICAL EXAM: Primary | ICD-10-CM

## 2023-01-25 PROCEDURE — 99386 PREV VISIT NEW AGE 40-64: CPT | Performed by: STUDENT IN AN ORGANIZED HEALTH CARE EDUCATION/TRAINING PROGRAM

## 2023-01-25 NOTE — PROGRESS NOTES
[address] [facility]  [address] [facility], [address] [facility]      Wendi Rojas  1/25/2023                       Primary Care Physician: TERA Workman CNP    CC:   Chief Complaint   Patient presents with    New Patient    Annual Exam         HPI: Wendi Rojas is a 59 y.o. female No obstetric history on file. Patient's last menstrual period was 05/16/2016 (approximate). The patient was seen and examined. She is here for an annual visit. She is complaining of nothing. Patient is menopausal and denies any bleeding since that time. Her bowel habits are regular. She denies any bloating. She denies dysuria. She denies urinary leaking. She denies vaginal discharge. She is sexually active with single partner, contraception - post menopausal status.       Depression Screen: Symptoms of decreased mood absent  Symptoms of anhedonia absent  **If either question is answered in a  positive fashion then complete the PHQ9 Scoring Evaluation and make the appropriate referral**    REVIEW OF SYSTEMS:   Constitutional: negative fever, negative chills  HEENT: negative visual disturbances, negative headaches  Respiratory: negative dyspnea, negative cough  Cardiovascular: negative chest pain,  negative palpitations  Gastrointestinal: negative abdominal pain, negative RUQ pain, negative N/V, negative diarrhea, negative constipation  Genitourinary: negative dysuria, negative vaginal discharge  Dermatological: negative rash  Hematologic: negative bruising  Immunologic/Lymphatic: negative recent illness, negative recent sick contact  Musculoskeletal: negative back pain, negative myalgias, negative arthralgias  Neurological:  negative dizziness, negative weakness  Behavior/Psych: negative depression, negative anxiety      GYNECOLOGICAL HISTORY:  Age of Menarche: 13  Age of Menopause: N/A     STD History: no past history    Permanent Sterilization: no  Reversible Birth Control: no  Hormone Replacement Exposure: no    OBSTETRICAL HISTORY:  OB History    Para Term  AB Living   0 0 0 0 0 0   SAB IAB Ectopic Molar Multiple Live Births   0 0 0 0 0 0       PAST MEDICAL HISTORY:   has a past medical history of Diabetes mellitus ([facility] [address] History of abnormal cervical Pap smear, Hyperlipidemia, and Palpitations. PAST SURGICAL HISTORY:   has a past surgical history that includes Breast surgery (Bilateral); Bunionectomy (Bilateral); Colonoscopy (14); Breast reduction surgery; Foot surgery (Left, 2022); and arthrodesis (Left, 2022). ALLERGIES:  is allergic to sulfa antibiotics. MEDICATIONS:  Prior to Admission medications    Medication Sig Start Date End Date Taking? Authorizing Provider   metFORMIN (GLUCOPHAGE) 1000 MG tablet take 1 tablet by mouth twice a day with food 23  Yes TERA Trinidad CNP   atorvastatin (LIPITOR) 40 MG tablet take 1 tablet by mouth once daily 23  Yes TERA Trinidad CNP   glimepiride (AMARYL) 1 MG tablet Take 2 tablets by mouth every morning 23  Yes TERA Trinidad CNP   Continuous Blood Gluc Sensor (FREESTYLE MARIA M 2 SENSOR) MISC 1 each by Does not apply route every 14 days 10/14/22  Yes TERA Trinidad CNP   Magnesium 400 MG TABS Take by mouth   Yes Historical Provider, MD   carvedilol (COREG) 3.125 MG tablet take 1 tablet by mouth twice a day with food 10/5/21  Yes Historical Provider, MD   ibuprofen (ADVIL;MOTRIN) 800 MG tablet Take 1 tablet by mouth 3 times daily as needed for Pain 21  Yes TERA Trinidad CNP   Multiple Vitamins-Minerals (MULTIVITAMIN PO) Take  by mouth. Yes Historical Provider, MD       FAMILY HISTORY:  Family History of Breast, Ovarian, Colon or Uterine Cancer: No   family history includes Stroke in her mother. SOCIAL HISTORY:   reports that she has never smoked.  She has never used smokeless tobacco. She reports that she does not currently use alcohol. She reports that she does not use drugs. HEALTH MAINTENANCE:  Immunization status: up to date and documented    [facility]: BIRADS 1 10/21. Next due now. Colonoscopy: 2014 Neg. Pap Smears: Last 6/20 Neg. Next due now. Family Planning: Menopausal.  DEXA: N/A    VITALS:  Vitals:    01/25/23 0908   BP: 139/84   Site: Right Upper Arm   Position: Sitting   Cuff Size: Medium Adult   Pulse: 79   Weight: 196 lb 3.2 oz (89 kg)                                                                                                                                                                                                   PHYSICAL EXAM:   General Appearance: Appears healthy. Alert; in no acute distress. Pleasant. Skin: Skin color, texture, turgor normal. No rashes or lesions. HEENT: normocephalic and atraumatic, Thyroid normal to inspection and palpation  Respiratory: Normal expansion. Clear to auscultation. No rales, rhonchi, or wheezing. Cardiovascular: normal rate, normal S1 and S2, no gallops, intact distal pulses and no carotid bruits  Breast:  (Chest): normal appearance, no masses or tenderness  Abdomen: soft, non-tender, non-distended, no right upper quadrant tenderness and no CVA tenderness  Pelvic Exam:   External genitalia: General appearance; normal, Hair distribution; normal, Lesions absent  Urinary system: urethral meatus normal  Vaginal: normal mucosa, no discharge  Cervix: normal appearing cervix without discharge or lesions  Adnexa: non-palpable  Uterus: normal single, nontender  Rectal Exam: exam declined by patient  Musculoskeletal: no gross abnormalities  Extremities: non-tender BLE and non-edematous  Psych:  oriented to time, place and person     DATA:  No results found for this visit on 01/25/23. ASSESSMENT & PLAN:    Noe Liriano is a 59 y.o. female No obstetric history on file.  Patient's last menstrual period was 05/16/2016 (approximate). Annual:  Mammogram: BIRADS 1 10/21. Next due now. Colonoscopy: 2014 Neg. Pap Smears: Last 6/20 Neg. Next due now. Family Planning: Menopausal.  DEXA: N/A    Patient Active Problem List    Diagnosis Date Noted    History of abnormal cervical Pap smear 10/14/2022     Priority: Medium    Abnormal cervical Papanicolaou smear 05/24/2017    Diabetes mellitus ([facility] [address] 03/18/2017    Mixed hyperlipidemia 03/18/2017    Metatarsalgia of both feet 01/15/2014       Return in about 1 year (around 1/25/2024) for Annual.  No Patient Care Coordination Note on file. Counseling Completed:    Discussed need for repeat pap as per American Society for Colposcopy and Cervical Pathology guidelines. Discussed need for mammograms every 1 year, If >44 yo and last mammogram was negative. Discussed Calcium and Vitamin D dosing. Discussed need for colonoscopy screening as well as onset for bone density testing. Discussed birth control and barrier recommendations. Discussed STD counseling and prevention. Discussed Gardisil counseling for all patients 10-37 yo. Hereditary Breast, Ovarian, Colon and Uterine Cancer screening discussed. Tobacco & Secondary smoke risks discussed; with recommendation for cessation and avoidance. Routine health maintenance per patients PCP discussed. Diagnosis Orders   1. Well woman exam with routine gynecological exam  PAP Smear      2.  Screening mammogram for breast cancer  [facility] W OR WO CAD BILATERAL           Niru Olson, [address] [facility], [address]  1/25/2023, 9:52 AM

## 2023-01-27 LAB
HPV SAMPLE: NORMAL
HPV, GENOTYPE 16: NOT DETECTED
HPV, GENOTYPE 18: NOT DETECTED
HPV, HIGH RISK OTHER: NOT DETECTED
HPV, INTERPRETATION: NORMAL
SPECIMEN DESCRIPTION: NORMAL

## 2023-02-22 ENCOUNTER — HOSPITAL ENCOUNTER (OUTPATIENT)
Dept: MAMMOGRAPHY | Age: 58
Discharge: HOME | End: 2023-02-24
Payer: COMMERCIAL

## 2023-02-22 DIAGNOSIS — Z12.31 SCREENING MAMMOGRAM FOR BREAST CANCER: ICD-10-CM

## 2023-02-22 PROCEDURE — 77063 BREAST TOMOSYNTHESIS BI: CPT

## 2023-02-25 DIAGNOSIS — E11.9 TYPE 2 DIABETES MELLITUS WITHOUT COMPLICATION, WITHOUT LONG-TERM CURRENT USE OF INSULIN (HCC): ICD-10-CM

## 2023-02-27 RX ORDER — GLIMEPIRIDE 1 MG/1
TABLET ORAL
Qty: 90 TABLET | OUTPATIENT
Start: 2023-02-27

## 2023-04-05 NOTE — TELEPHONE ENCOUNTER
Last visit: 1/16/23  Last Med refill: 10/14/22  Does patient have enough medication for 72 hours: Yes    Next Visit Date:  Future Appointments   Date Time Provider Omar Barrera   7/17/2023  7:30 AM TERA High CNP [facility] [address] Maintenance   Topic Date Due    Hepatitis B vaccine (1 of 3 - Risk 3-dose series) Never done    Shingles vaccine (1 of 2) Never done    Diabetic foot exam  02/25/2020    COVID-19 Vaccine (1) 07/12/2023 (Originally 3/21/1966)    Pneumococcal 0-64 years Vaccine (1 - PCV) 01/16/2024 (Originally 9/21/1971)    Diabetic Alb to Cr ratio (uACR) test  10/14/2023    GFR test (Diabetes, CKD 3-4, OR last GFR 15-59)  12/22/2023    A1C test (Diabetic or Prediabetic)  01/16/2024    Depression Screen  01/16/2024    Lipids  01/23/2024    Colorectal Cancer Screen  07/01/2024    Breast cancer screen  02/22/2025    Cervical cancer screen  01/25/2028    DTaP/Tdap/Td vaccine (3 - Td or Tdap) 01/28/2030    Flu vaccine  Completed    Hepatitis C screen  Completed    HIV screen  Completed    Hepatitis A vaccine  Aged Out    Hib vaccine  Aged Out    Meningococcal (ACWY) vaccine  Aged Out    Diabetic retinal exam  Discontinued       Hemoglobin A1C (%)   Date Value   01/16/2023 5.5   10/14/2022 7.5   07/12/2022 8.4             ( goal A1C is < 7)   Microalb/Crt.  Ratio (mcg/mg creat)   Date Value   08/26/2015 12     LDL Cholesterol (mg/dL)   Date Value   01/23/2023 95   07/13/2021 108       (goal LDL is <100)   AST (U/L)   Date Value   07/13/2021 17     ALT (U/L)   Date Value   07/13/2021 19     BUN (mg/dL)   Date Value   12/22/2022 11     BP Readings from Last 3 Encounters:   01/25/23 139/84   01/16/23 110/66   10/14/22 124/70          (goal 120/80)    All Future Testing planned in [facility]  Lab Frequency Next Occurrence   PAP Smear Once 01/25/2023               Patient Active Problem List:     Metatarsalgia of both feet     Diabetes mellitus ([facility] [address]     Mixed hyperlipidemia

## 2023-07-09 DIAGNOSIS — E11.9 TYPE 2 DIABETES MELLITUS WITHOUT COMPLICATION, WITHOUT LONG-TERM CURRENT USE OF INSULIN (HCC): ICD-10-CM

## 2023-07-10 RX ORDER — ATORVASTATIN CALCIUM 40 MG/1
TABLET, FILM COATED ORAL
Qty: 90 TABLET | Refills: 1 | Status: SHIPPED | OUTPATIENT
Start: 2023-07-10

## 2023-07-10 NOTE — TELEPHONE ENCOUNTER
Last visit: 1/16/23  Last Med refill: 1/16/23  Does patient have enough medication for 72 hours: Yes    Next Visit Date:  Future Appointments   Date Time Provider [address]   7/18/2023  7:30 AM TERA Bettencourt CNP [facility] [address] Maintenance   Topic Date Due    Hepatitis B vaccine (1 of 3 - Risk 3-dose series) Never done    Shingles vaccine (1 of 2) Never done    Diabetic foot exam  02/25/2020    Diabetic retinal exam  05/17/2022    COVID-19 Vaccine (1) 07/12/2023 (Originally 3/21/1966)    Pneumococcal 0-64 years Vaccine (1 - PCV) 01/16/2024 (Originally 9/21/1971)    Flu vaccine (1) 08/01/2023    Diabetic Alb to Cr ratio (uACR) test  10/14/2023    GFR test (Diabetes, CKD 3-4, OR last GFR 15-59)  12/22/2023    A1C test (Diabetic or Prediabetic)  01/16/2024    Depression Screen  01/16/2024    Lipids  01/23/2024    Colorectal Cancer Screen  07/01/2024    Breast cancer screen  02/22/2025    Cervical cancer screen  01/25/2028    DTaP/Tdap/Td vaccine (3 - Td or Tdap) 01/28/2030    Hepatitis C screen  Completed    HIV screen  Completed    Hepatitis A vaccine  Aged Out    Hib vaccine  Aged Out    Meningococcal (ACWY) vaccine  Aged Out       Hemoglobin A1C (%)   Date Value   01/16/2023 5.5   10/14/2022 7.5   07/12/2022 8.4             ( goal A1C is < 7)   Microalb/Crt.  Ratio (mcg/mg creat)   Date Value   08/26/2015 12     LDL Cholesterol (mg/dL)   Date Value   01/23/2023 95   07/13/2021 108       (goal LDL is <100)   AST (U/L)   Date Value   07/13/2021 17     ALT (U/L)   Date Value   07/13/2021 19     BUN (mg/dL)   Date Value   12/22/2022 11     BP Readings from Last 3 Encounters:   01/25/23 139/84   01/16/23 110/66   10/14/22 124/70          (goal 120/80)    All Future Testing planned in [facility]  Lab Frequency Next Occurrence   PAP Smear Once 01/25/2023               Patient Active Problem List:     Metatarsalgia of both feet     Diabetes mellitus ([address])     Mixed hyperlipidemia

## 2023-07-18 ENCOUNTER — HOSPITAL ENCOUNTER (OUTPATIENT)
Age: 58
Setting detail: SPECIMEN
Discharge: HOME | End: 2023-07-18

## 2023-07-18 ENCOUNTER — OFFICE VISIT (OUTPATIENT)
Dept: FAMILY MEDICINE CLINIC | Age: 58
End: 2023-07-18
Payer: COMMERCIAL

## 2023-07-18 VITALS
BODY MASS INDEX: 30.54 KG/M2 | HEART RATE: 58 BPM | DIASTOLIC BLOOD PRESSURE: 70 MMHG | OXYGEN SATURATION: 98 % | SYSTOLIC BLOOD PRESSURE: 124 MMHG | WEIGHT: 195 LBS

## 2023-07-18 DIAGNOSIS — R39.15 URINARY URGENCY: ICD-10-CM

## 2023-07-18 DIAGNOSIS — E11.9 TYPE 2 DIABETES MELLITUS WITHOUT COMPLICATION, WITHOUT LONG-TERM CURRENT USE OF INSULIN (HCC): Primary | ICD-10-CM

## 2023-07-18 LAB
BILIRUBIN, POC: ABNORMAL
BLOOD URINE, POC: ABNORMAL
CLARITY, POC: CLEAR
COLOR, POC: ABNORMAL
GLUCOSE URINE, POC: ABNORMAL
HBA1C MFR BLD: 7.4 %
KETONES, POC: ABNORMAL
LEUKOCYTE EST, POC: ABNORMAL
NITRITE, POC: ABNORMAL
PH, POC: 6
PROTEIN, POC: ABNORMAL
SPECIFIC GRAVITY, POC: 1.01
UROBILINOGEN, POC: 0.2

## 2023-07-18 PROCEDURE — 83037 HB GLYCOSYLATED A1C HOME DEV: CPT | Performed by: NURSE PRACTITIONER

## 2023-07-18 PROCEDURE — 81002 URINALYSIS NONAUTO W/O SCOPE: CPT | Performed by: NURSE PRACTITIONER

## 2023-07-18 PROCEDURE — 3051F HG A1C>EQUAL 7.0%<8.0%: CPT | Performed by: NURSE PRACTITIONER

## 2023-07-18 PROCEDURE — 99214 OFFICE O/P EST MOD 30 MIN: CPT | Performed by: NURSE PRACTITIONER

## 2023-07-18 RX ORDER — GLIMEPIRIDE 1 MG/1
2 TABLET ORAL EVERY MORNING
Qty: 180 TABLET | Refills: 1 | Status: SHIPPED | OUTPATIENT
Start: 2023-07-18

## 2023-07-18 RX ORDER — CARVEDILOL 6.25 MG/1
6.25 TABLET ORAL 2 TIMES DAILY
COMMUNITY
Start: 2023-07-09

## 2023-07-18 SDOH — ECONOMIC STABILITY: FOOD INSECURITY: WITHIN THE PAST 12 MONTHS, THE FOOD YOU BOUGHT JUST DIDN'T LAST AND YOU DIDN'T HAVE MONEY TO GET MORE.: NEVER TRUE

## 2023-07-18 SDOH — ECONOMIC STABILITY: FOOD INSECURITY: WITHIN THE PAST 12 MONTHS, YOU WORRIED THAT YOUR FOOD WOULD RUN OUT BEFORE YOU GOT MONEY TO BUY MORE.: NEVER TRUE

## 2023-07-18 SDOH — ECONOMIC STABILITY: HOUSING INSECURITY
IN THE LAST 12 MONTHS, WAS THERE A TIME WHEN YOU DID NOT HAVE A STEADY PLACE TO SLEEP OR SLEPT IN A SHELTER (INCLUDING NOW)?: NO

## 2023-07-18 SDOH — ECONOMIC STABILITY: INCOME INSECURITY: HOW HARD IS IT FOR YOU TO PAY FOR THE VERY BASICS LIKE FOOD, HOUSING, MEDICAL CARE, AND HEATING?: NOT HARD AT ALL

## 2023-07-18 ASSESSMENT — ENCOUNTER SYMPTOMS
COUGH: 0
NAUSEA: 0
SHORTNESS OF BREATH: 0
VOMITING: 0

## 2023-07-18 NOTE — PROGRESS NOTES
Carley Corral (:  1965) is a 59 y.o. female,Established patient, here for evaluation of the following chief complaint(s):  Diabetes and 6 Month Follow-Up      ASSESSMENT/PLAN:  1. Type 2 diabetes mellitus without complication, without long-term current use of insulin ([facility])  -     POCT glycosylated hemoglobin (Hb A1C)  -     glimepiride (AMARYL) 1 MG tablet; Take 2 tablets by mouth every morning, Disp-180 tablet, R-1Normal  2. Urinary urgency  -     Culture, Urine; Future  -     POCT Urinalysis no Micro      No follow-ups on file. Subjective   SUBJECTIVE/OBJECTIVE:  Urinary Pain   This is a new problem. The quality of the pain is described as burning (on saturda). There has been no fever. Associated symptoms include urgency. Pertinent negatives include no chills, nausea or vomiting. She has tried NSAIDs for the symptoms. The treatment provided mild relief. Last a1c was 5.5-  today is 7. 4.  her averages on her [employer] are wnl. No major dietary or activity changes. Will repeat a1c in 3 months. Review of Systems   Constitutional:  Negative for chills and fever. Respiratory:  Negative for cough and shortness of breath. Cardiovascular:  Negative for chest pain, palpitations and leg swelling. Gastrointestinal:  Negative for nausea and vomiting. Genitourinary:  Positive for urgency. Objective   Vitals:    23 0730   BP: 124/70   Pulse: 58   SpO2: 98%     Wt Readings from Last 3 Encounters:   23 195 lb (88.5 kg)   23 196 lb 3.2 oz (89 kg)   23 196 lb (88.9 kg)       Physical Exam  Constitutional:       Appearance: She is well-developed. HENT:      Right Ear: External ear normal.      Left Ear: External ear normal.      Nose: Nose normal.   Cardiovascular:      Rate and Rhythm: Normal rate and regular rhythm. Heart sounds: Normal heart sounds, S1 normal and S2 normal.   Pulmonary:      Effort: Pulmonary effort is normal. No respiratory distress.

## 2023-07-19 LAB
MICROORGANISM SPEC CULT: NORMAL
SPECIMEN DESCRIPTION: NORMAL

## 2023-09-25 NOTE — TELEPHONE ENCOUNTER
Last visit: 7/18/23  Last Med refill: 4/5/23  Does patient have enough medication for 72 hours: Yes    Next Visit Date:  Future Appointments   Date Time Provider [address]   1/19/2024  7:30 AM ETRA Bettencourt CNP [facility] [address] Maintenance   Topic Date Due    COVID-19 Vaccine (1) Never done    Shingles vaccine (1 of 2) Never done    Diabetic foot exam  02/25/2020    Diabetic retinal exam  05/17/2022    Flu vaccine (1) 08/01/2023    Hepatitis B vaccine (1 of 3 - Risk 3-dose series) Never done    Diabetic Alb to Cr ratio (uACR) test  10/14/2023    Pneumococcal 0-64 years Vaccine (1 - PCV) 01/16/2024 (Originally 9/21/1971)    GFR test (Diabetes, CKD 3-4, OR last GFR 15-59)  12/22/2023    Depression Screen  01/16/2024    Lipids  01/23/2024    Colorectal Cancer Screen  07/01/2024    A1C test (Diabetic or Prediabetic)  07/18/2024    Breast cancer screen  02/22/2025    Cervical cancer screen  01/25/2028    DTaP/Tdap/Td vaccine (3 - Td or Tdap) 01/28/2030    Hepatitis C screen  Completed    HIV screen  Completed    Hepatitis A vaccine  Aged Out    Hib vaccine  Aged Out    Meningococcal (ACWY) vaccine  Aged Out       Hemoglobin A1C (%)   Date Value   07/18/2023 7.4   01/16/2023 5.5   10/14/2022 7.5             ( goal A1C is < 7)   No components found for: \"LABMICR\"  LDL Cholesterol (mg/dL)   Date Value   01/23/2023 95   07/13/2021 108       (goal LDL is <100)   AST (U/L)   Date Value   07/13/2021 17     ALT (U/L)   Date Value   07/13/2021 19     BUN (mg/dL)   Date Value   12/22/2022 11     BP Readings from Last 3 Encounters:   07/18/23 124/70   01/25/23 139/84   01/16/23 110/66          (goal 120/80)    All Future Testing planned in [facility]  Lab Frequency Next Occurrence   PAP Smear Once 01/25/2023               Patient Active Problem List:     Metatarsalgia of both feet     Diabetes mellitus ([address])     Mixed hyperlipidemia     Abnormal cervical Papanicolaou smear     History of

## 2023-12-28 DIAGNOSIS — E11.9 TYPE 2 DIABETES MELLITUS WITHOUT COMPLICATION, WITHOUT LONG-TERM CURRENT USE OF INSULIN (HCC): ICD-10-CM

## 2023-12-28 RX ORDER — ATORVASTATIN CALCIUM 40 MG/1
TABLET, FILM COATED ORAL
Qty: 90 TABLET | Refills: 1 | Status: SHIPPED | OUTPATIENT
Start: 2023-12-28

## 2023-12-28 NOTE — TELEPHONE ENCOUNTER
Last visit: 07/18/2023  Last Med refill: 11/30/2023  Does patient have enough medication for 72 hours: No:     Next Visit Date:  Future Appointments   Date Time Provider [address]   1/19/2024  7:30 AM TERA Bettencourt - CNP [facility] [address] Maintenance   Topic Date Due    COVID-19 Vaccine (1) Never done    Shingles vaccine (1 of 2) Never done    Diabetic foot exam  02/25/2020    Diabetic retinal exam  05/17/2022    Flu vaccine (1) 08/01/2023    Respiratory Syncytial Virus (RSV) Pregnant or age 61 yrs+ (1 - 1-dose 60+ series) Never done    Diabetic Alb to Cr ratio (uACR) test  10/14/2023    GFR test (Diabetes, CKD 3-4, OR last GFR 15-59)  12/22/2023    Depression Screen  01/16/2024    Lipids  01/23/2024    Pneumococcal 0-64 years Vaccine (1 - PCV) 01/16/2024 (Originally 9/21/1971)    Colorectal Cancer Screen  07/01/2024    A1C test (Diabetic or Prediabetic)  07/18/2024    Breast cancer screen  02/22/2025    Cervical cancer screen  01/25/2028    DTaP/Tdap/Td vaccine (3 - Td or Tdap) 01/28/2030    Hepatitis C screen  Completed    HIV screen  Completed    Hepatitis A vaccine  Aged Out    Hepatitis B vaccine  Aged Out    Hib vaccine  Aged Out    Polio vaccine  Aged Out    Meningococcal (ACWY) vaccine  Aged Out       Hemoglobin A1C (%)   Date Value   07/18/2023 7.4   01/16/2023 5.5   10/14/2022 7.5             ( goal A1C is < 7)   No components found for: \"LABMICR\"  LDL Cholesterol (mg/dL)   Date Value   01/23/2023 95   07/13/2021 108       (goal LDL is <100)   AST (U/L)   Date Value   07/13/2021 17     ALT (U/L)   Date Value   07/13/2021 19     BUN (mg/dL)   Date Value   12/22/2022 11     BP Readings from Last 3 Encounters:   07/18/23 124/70   01/25/23 139/84   01/16/23 110/66          (goal 120/80)    All Future Testing planned in [facility]  Lab Frequency Next Occurrence   PAP Smear Once 01/25/2023               Patient Active Problem List:     Metatarsalgia of both feet     Diabetes

## 2024-01-10 DIAGNOSIS — E11.9 TYPE 2 DIABETES MELLITUS WITHOUT COMPLICATION, WITHOUT LONG-TERM CURRENT USE OF INSULIN (HCC): ICD-10-CM

## 2024-01-10 NOTE — TELEPHONE ENCOUNTER
Kimber Hale is calling to request a refill on the following medication(s):    Last Visit Date (If Applicable):  7/18/2023    Next Visit Date:    1/19/2024    Medication Request:  Requested Prescriptions     Pending Prescriptions Disp Refills    glimepiride (AMARYL) 1 MG tablet [Pharmacy Med Name: GLIMEPIRIDE 1 MG TABLET] 180 tablet 1     Sig: take 2 tablets by mouth every morning

## 2024-01-11 RX ORDER — GLIMEPIRIDE 1 MG/1
2 TABLET ORAL EVERY MORNING
Qty: 180 TABLET | Refills: 1 | Status: SHIPPED | OUTPATIENT
Start: 2024-01-11

## 2024-02-09 ENCOUNTER — OFFICE VISIT (OUTPATIENT)
Dept: FAMILY MEDICINE CLINIC | Age: 59
End: 2024-02-09
Payer: COMMERCIAL

## 2024-02-09 VITALS
DIASTOLIC BLOOD PRESSURE: 78 MMHG | HEIGHT: 67 IN | OXYGEN SATURATION: 97 % | WEIGHT: 198 LBS | SYSTOLIC BLOOD PRESSURE: 136 MMHG | RESPIRATION RATE: 18 BRPM | HEART RATE: 78 BPM | BODY MASS INDEX: 31.08 KG/M2

## 2024-02-09 DIAGNOSIS — Z12.31 ENCOUNTER FOR SCREENING MAMMOGRAM FOR BREAST CANCER: ICD-10-CM

## 2024-02-09 DIAGNOSIS — Z13.29 SCREENING FOR THYROID DISORDER: ICD-10-CM

## 2024-02-09 DIAGNOSIS — Z12.11 SCREENING FOR MALIGNANT NEOPLASM OF COLON: ICD-10-CM

## 2024-02-09 DIAGNOSIS — E11.9 TYPE 2 DIABETES MELLITUS WITHOUT COMPLICATION, WITHOUT LONG-TERM CURRENT USE OF INSULIN (HCC): Primary | ICD-10-CM

## 2024-02-09 LAB — HBA1C MFR BLD: 8.1 %

## 2024-02-09 PROCEDURE — 3052F HG A1C>EQUAL 8.0%<EQUAL 9.0%: CPT | Performed by: NURSE PRACTITIONER

## 2024-02-09 PROCEDURE — 83036 HEMOGLOBIN GLYCOSYLATED A1C: CPT | Performed by: NURSE PRACTITIONER

## 2024-02-09 PROCEDURE — 99214 OFFICE O/P EST MOD 30 MIN: CPT | Performed by: NURSE PRACTITIONER

## 2024-02-09 RX ORDER — SEMAGLUTIDE 1.34 MG/ML
INJECTION, SOLUTION SUBCUTANEOUS
Qty: 3 ML | Refills: 0 | Status: SHIPPED | OUTPATIENT
Start: 2024-02-09 | End: 2024-04-05

## 2024-02-09 RX ORDER — SEMAGLUTIDE 1.34 MG/ML
0.5 INJECTION, SOLUTION SUBCUTANEOUS
Qty: 3 ML | Refills: 5 | Status: SHIPPED | OUTPATIENT
Start: 2024-04-08 | End: 2024-05-06

## 2024-02-09 ASSESSMENT — PATIENT HEALTH QUESTIONNAIRE - PHQ9
SUM OF ALL RESPONSES TO PHQ QUESTIONS 1-9: 0
SUM OF ALL RESPONSES TO PHQ QUESTIONS 1-9: 0
1. LITTLE INTEREST OR PLEASURE IN DOING THINGS: 0
SUM OF ALL RESPONSES TO PHQ9 QUESTIONS 1 & 2: 0
SUM OF ALL RESPONSES TO PHQ QUESTIONS 1-9: 0
2. FEELING DOWN, DEPRESSED OR HOPELESS: 0

## 2024-02-09 ASSESSMENT — ENCOUNTER SYMPTOMS
SHORTNESS OF BREATH: 0
COUGH: 0

## 2024-02-09 NOTE — PROGRESS NOTES
Kimber Hale (:  1965) is a 58 y.o. female,Established patient, here for evaluation of the following chief complaint(s):  Diabetes and Health Maintenance (A1C completed in office. /Depression screen completed. /Eye exam-in 2023-[facility] in [place]. /Refused immunizations/Labs ordered. / foot exam. )         ASSESSMENT/PLAN:  1. Type 2 diabetes mellitus without complication, without long-term current use of insulin (HCC)  Will stop glimperide  Start ozempic  She will call if difficulty getting from pharmacy   -     POCT glycosylated hemoglobin (Hb A1C)  -     Lipid Panel; Future  -     Comprehensive Metabolic Panel; Future  -     Microalbumin, Ur  -     Continuous Blood Gluc Sensor (FREESTYLE MARIA M 2 SENSOR) MISC; Inject 1 each into the skin every 14 days, Disp-2 each, R-5Print  -      DIABETES FOOT EXAM  -     Semaglutide,0.25 or 0.5MG/DOS, (OZEMPIC, 0.25 OR 0.5 MG/DOSE,) 2 MG/1.5ML SOPN; Inject 0.25 mg into the skin every 7 days for 28 days, THEN 0.5 mg every 7 days for 28 days., Disp-3 mL, R-0Print  -     Semaglutide,0.25 or 0.5MG/DOS, (OZEMPIC, 0.25 OR 0.5 MG/DOSE,) 2 MG/1.5ML SOPN; Inject 0.5 mg into the skin every 7 days for 28 days, Disp-3 mL, R-5Print  -     CBC; Future  2. Screening for thyroid disorder  -     TSH; Future  3. Encounter for screening mammogram for breast cancer  -     [facility] DIGITAL SCREEN W OR WO CAD BILATERAL; Future  4. Screening for malignant neoplasm of colon  -     [facility] Screening Colonoscopy    Dm- last A1c was 7.4 in July, today is 8.1- Kimber can't think of anything different with food choices.  She is unable to exercise because of her feet.        No follow-ups on file.         Subjective   SUBJECTIVE/OBJECTIVE:  Diabetes  Pertinent negatives for diabetes include no chest pain.     Pt states her A1c is going to be high, she doesn't know why, states she hasn't done anything different.     Review of Systems   Constitutional:  Negative for chills and

## 2024-02-20 ENCOUNTER — TELEPHONE (OUTPATIENT)
Dept: GASTROENTEROLOGY | Age: 59
End: 2024-02-20

## 2024-02-20 NOTE — TELEPHONE ENCOUNTER
Pt LMOV asking for a return call to schedule colon recall with chanelle, pt last colon was 7/1/14 with Kenya, thanks.

## 2024-02-23 ENCOUNTER — HOSPITAL ENCOUNTER (OUTPATIENT)
Age: 59
Setting detail: SPECIMEN
Discharge: HOME | End: 2024-02-23

## 2024-02-23 DIAGNOSIS — E11.9 TYPE 2 DIABETES MELLITUS WITHOUT COMPLICATION, WITHOUT LONG-TERM CURRENT USE OF INSULIN (HCC): ICD-10-CM

## 2024-02-23 DIAGNOSIS — Z13.29 SCREENING FOR THYROID DISORDER: ICD-10-CM

## 2024-02-23 DIAGNOSIS — Z12.11 SCREENING FOR MALIGNANT NEOPLASM OF COLON: Primary | ICD-10-CM

## 2024-02-23 DIAGNOSIS — Z86.010 HISTORY OF COLON POLYPS: ICD-10-CM

## 2024-02-23 LAB
ALBUMIN SERPL-MCNC: 4.2 G/DL (ref 3.5–5.2)
ALBUMIN/GLOB SERPL: 1.6 {RATIO} (ref 1–2.5)
ALP SERPL-CCNC: 109 U/L (ref 35–104)
ALT SERPL-CCNC: 19 U/L (ref 5–33)
ANION GAP SERPL CALCULATED.3IONS-SCNC: 11 MMOL/L (ref 9–17)
AST SERPL-CCNC: 17 U/L
BILIRUB SERPL-MCNC: 0.4 MG/DL (ref 0.3–1.2)
BUN SERPL-MCNC: 9 MG/DL (ref 8–23)
CALCIUM SERPL-MCNC: 9.6 MG/DL (ref 8.6–10.4)
CHLORIDE SERPL-SCNC: 102 MMOL/L (ref 98–107)
CHOLEST SERPL-MCNC: 179 MG/DL
CHOLESTEROL/HDL RATIO: 2.8
CO2 SERPL-SCNC: 26 MMOL/L (ref 20–31)
CREAT SERPL-MCNC: 0.7 MG/DL (ref 0.5–0.9)
ERYTHROCYTE [DISTWIDTH] IN BLOOD BY AUTOMATED COUNT: 12.3 % (ref 11.8–14.4)
GFR SERPL CREATININE-BSD FRML MDRD: >60 ML/MIN/1.73M2
GLUCOSE SERPL-MCNC: 185 MG/DL (ref 70–99)
HCT VFR BLD AUTO: 40.1 % (ref 36.3–47.1)
HDLC SERPL-MCNC: 65 MG/DL
HGB BLD-MCNC: 12.4 G/DL (ref 11.9–15.1)
LDLC SERPL CALC-MCNC: 90 MG/DL (ref 0–130)
MCH RBC QN AUTO: 30.2 PG (ref 25.2–33.5)
MCHC RBC AUTO-ENTMCNC: 30.9 G/DL (ref 28.4–34.8)
MCV RBC AUTO: 97.8 FL (ref 82.6–102.9)
NRBC BLD-RTO: 0 PER 100 WBC
PLATELET # BLD AUTO: 407 K/UL (ref 138–453)
PMV BLD AUTO: 9.9 FL (ref 8.1–13.5)
POTASSIUM SERPL-SCNC: 5.4 MMOL/L (ref 3.7–5.3)
PROT SERPL-MCNC: 6.9 G/DL (ref 6.4–8.3)
RBC # BLD AUTO: 4.1 M/UL (ref 3.95–5.11)
SODIUM SERPL-SCNC: 139 MMOL/L (ref 135–144)
TRIGL SERPL-MCNC: 121 MG/DL
TSH SERPL DL<=0.05 MIU/L-ACNC: 2.57 UIU/ML (ref 0.3–5)
WBC OTHER # BLD: 10.5 K/UL (ref 3.5–11.3)

## 2024-02-23 NOTE — TELEPHONE ENCOUNTER
Called patient and reviewed colon screening questionnaire.  Okay to schedule.  Scheduled for 6-24-24 930 am at [facility].  Reviewed bowel prep instructions with patient over phone and sent via [facility].  Writer thanked and call ended.

## 2024-02-25 RX ORDER — POLYETHYLENE GLYCOL 3350 17 G/17G
POWDER, FOR SOLUTION ORAL
Qty: 238 G | Refills: 0 | Status: SHIPPED | OUTPATIENT
Start: 2024-02-25

## 2024-02-25 RX ORDER — BISACODYL 5 MG/1
TABLET, DELAYED RELEASE ORAL
Qty: 4 TABLET | Refills: 0 | Status: SHIPPED | OUTPATIENT
Start: 2024-02-25

## 2024-02-27 DIAGNOSIS — E87.5 HYPERKALEMIA: Primary | ICD-10-CM

## 2024-02-27 DIAGNOSIS — R74.8 ELEVATED ALKALINE PHOSPHATASE LEVEL: ICD-10-CM

## 2024-03-07 ENCOUNTER — HOSPITAL ENCOUNTER (OUTPATIENT)
Dept: MAMMOGRAPHY | Age: 59
Discharge: HOME | End: 2024-03-09
Payer: COMMERCIAL

## 2024-03-07 VITALS — BODY MASS INDEX: 31.08 KG/M2 | HEIGHT: 67 IN | WEIGHT: 198 LBS

## 2024-03-07 DIAGNOSIS — Z12.31 ENCOUNTER FOR SCREENING MAMMOGRAM FOR BREAST CANCER: ICD-10-CM

## 2024-03-07 PROCEDURE — 77063 BREAST TOMOSYNTHESIS BI: CPT

## 2024-03-29 ENCOUNTER — HOSPITAL ENCOUNTER (OUTPATIENT)
Age: 59
Setting detail: SPECIMEN
Discharge: HOME | End: 2024-03-29

## 2024-03-29 DIAGNOSIS — R74.8 ELEVATED ALKALINE PHOSPHATASE LEVEL: ICD-10-CM

## 2024-03-29 DIAGNOSIS — E87.5 HYPERKALEMIA: ICD-10-CM

## 2024-03-29 LAB
ALBUMIN SERPL-MCNC: 4.4 G/DL (ref 3.5–5.2)
ALBUMIN/GLOB SERPL: 2 {RATIO} (ref 1–2.5)
ALP SERPL-CCNC: 88 U/L (ref 35–104)
ALT SERPL-CCNC: 20 U/L (ref 10–35)
ANION GAP SERPL CALCULATED.3IONS-SCNC: 13 MMOL/L (ref 9–16)
AST SERPL-CCNC: 20 U/L (ref 10–35)
BILIRUB SERPL-MCNC: 0.5 MG/DL (ref 0–1.2)
BUN SERPL-MCNC: 9 MG/DL (ref 8–23)
CALCIUM SERPL-MCNC: 9.4 MG/DL (ref 8.6–10.4)
CHLORIDE SERPL-SCNC: 104 MMOL/L (ref 98–107)
CO2 SERPL-SCNC: 24 MMOL/L (ref 20–31)
CREAT SERPL-MCNC: 0.8 MG/DL (ref 0.5–0.9)
CREAT UR-MCNC: 59.6 MG/DL (ref 28–217)
GFR SERPL CREATININE-BSD FRML MDRD: 80 ML/MIN/1.73M2
GLUCOSE SERPL-MCNC: 128 MG/DL (ref 74–99)
MICROALBUMIN UR-MCNC: <12 MG/L (ref 0–20)
MICROALBUMIN/CREAT UR-RTO: NORMAL MCG/MG CREAT (ref 0–25)
POTASSIUM SERPL-SCNC: 4.8 MMOL/L (ref 3.7–5.3)
PROT SERPL-MCNC: 7 G/DL (ref 6.6–8.7)
SODIUM SERPL-SCNC: 141 MMOL/L (ref 136–145)

## 2024-05-10 ENCOUNTER — OFFICE VISIT (OUTPATIENT)
Dept: FAMILY MEDICINE CLINIC | Age: 59
End: 2024-05-10
Payer: COMMERCIAL

## 2024-05-10 ENCOUNTER — TELEPHONE (OUTPATIENT)
Dept: FAMILY MEDICINE CLINIC | Age: 59
End: 2024-05-10

## 2024-05-10 VITALS
OXYGEN SATURATION: 98 % | DIASTOLIC BLOOD PRESSURE: 70 MMHG | SYSTOLIC BLOOD PRESSURE: 136 MMHG | WEIGHT: 199 LBS | HEART RATE: 72 BPM | BODY MASS INDEX: 31.17 KG/M2

## 2024-05-10 DIAGNOSIS — E11.9 TYPE 2 DIABETES MELLITUS WITHOUT COMPLICATION, WITHOUT LONG-TERM CURRENT USE OF INSULIN (HCC): Primary | ICD-10-CM

## 2024-05-10 DIAGNOSIS — E11.9 TYPE 2 DIABETES MELLITUS WITHOUT COMPLICATION, WITHOUT LONG-TERM CURRENT USE OF INSULIN (HCC): ICD-10-CM

## 2024-05-10 LAB — HBA1C MFR BLD: 7.7 %

## 2024-05-10 PROCEDURE — 83036 HEMOGLOBIN GLYCOSYLATED A1C: CPT | Performed by: NURSE PRACTITIONER

## 2024-05-10 PROCEDURE — 99214 OFFICE O/P EST MOD 30 MIN: CPT | Performed by: NURSE PRACTITIONER

## 2024-05-10 PROCEDURE — 3051F HG A1C>EQUAL 7.0%<8.0%: CPT | Performed by: NURSE PRACTITIONER

## 2024-05-10 RX ORDER — ATORVASTATIN CALCIUM 40 MG/1
40 TABLET, FILM COATED ORAL DAILY
Qty: 90 TABLET | Refills: 1 | Status: SHIPPED | OUTPATIENT
Start: 2024-05-10

## 2024-05-10 RX ORDER — SEMAGLUTIDE 1.34 MG/ML
0.5 INJECTION, SOLUTION SUBCUTANEOUS
Qty: 3 ML | Refills: 5 | Status: SHIPPED | OUTPATIENT
Start: 2024-06-10 | End: 2024-07-08

## 2024-05-10 RX ORDER — SEMAGLUTIDE 1.34 MG/ML
0.25 INJECTION, SOLUTION SUBCUTANEOUS
Qty: 2 ML | Refills: 0 | Status: SHIPPED | OUTPATIENT
Start: 2024-05-10 | End: 2024-06-07

## 2024-05-10 ASSESSMENT — ENCOUNTER SYMPTOMS
COUGH: 0
SHORTNESS OF BREATH: 0

## 2024-05-10 NOTE — TELEPHONE ENCOUNTER
Patient states she was unable to  ozempic.  States it was too expensive and not sure if it was due to a high copay or because insurance didn't cover it.    Called pharmacy for clarification. They stated they do not know if it is due to a high copay or if insurance didn't cover it.  States the scripts are on hold and patient never picked them up.  Asked if they could run it through her insurance to see if it needs a PA. States stated they could do this, but it would take an hour.    Informed patient of this.   She is asking if this can be sent to [facility] instead.

## 2024-05-10 NOTE — PROGRESS NOTES
Patient is present for 3 month f/u for DM  
LABGLOM 80 03/29/2024    GFRAA >60 01/03/2022       Lab Results   Component Value Date    TSH 2.57 02/23/2024       Physical Exam  Constitutional:       Appearance: She is well-developed.   HENT:      Right Ear: External ear normal.      Left Ear: External ear normal.      Nose: Nose normal.   Cardiovascular:      Rate and Rhythm: Normal rate and regular rhythm.      Heart sounds: Normal heart sounds, S1 normal and S2 normal.   Pulmonary:      Effort: Pulmonary effort is normal. No respiratory distress.      Breath sounds: Normal breath sounds.   Musculoskeletal:         General: No deformity. Normal range of motion.      Cervical back: Full passive range of motion without pain and normal range of motion.   Skin:     General: Skin is warm and dry.   Neurological:      Mental Status: She is alert and oriented to person, place, and time.       An electronic signature was used to authenticate this note.    --TERA TORRES - CNP

## 2024-05-21 ENCOUNTER — TELEPHONE (OUTPATIENT)
Dept: FAMILY MEDICINE CLINIC | Age: 59
End: 2024-05-21

## 2024-05-21 RX ORDER — SEMAGLUTIDE 0.68 MG/ML
0.5 INJECTION, SOLUTION SUBCUTANEOUS WEEKLY
Qty: 3 ML | Refills: 2 | Status: SHIPPED | OUTPATIENT
Start: 2024-06-21

## 2024-05-21 RX ORDER — SEMAGLUTIDE 0.68 MG/ML
0.25 INJECTION, SOLUTION SUBCUTANEOUS WEEKLY
Qty: 3 ML | Refills: 0 | Status: SHIPPED | OUTPATIENT
Start: 2024-05-21 | End: 2024-06-18

## 2024-05-21 NOTE — TELEPHONE ENCOUNTER
[facility] is needing a new script for ozempic.  2mg/1.5ml pens are no longer made and they need 2mg/3ml pens sent in.

## 2024-06-10 RX ORDER — DAPAGLIFLOZIN 5 MG/1
5 TABLET, FILM COATED ORAL EVERY MORNING
Qty: 30 TABLET | Refills: 3 | Status: SHIPPED | OUTPATIENT
Start: 2024-06-10

## 2024-07-08 DIAGNOSIS — E11.9 TYPE 2 DIABETES MELLITUS WITHOUT COMPLICATION, WITHOUT LONG-TERM CURRENT USE OF INSULIN (HCC): ICD-10-CM

## 2024-07-08 RX ORDER — GLIMEPIRIDE 1 MG/1
2 TABLET ORAL EVERY MORNING
Qty: 180 TABLET | Refills: 1 | Status: SHIPPED | OUTPATIENT
Start: 2024-07-08

## 2024-07-08 NOTE — TELEPHONE ENCOUNTER
Last visit: 05/10/2024  Last Med refill: 06/11/2024  Does patient have enough medication for 72 hours: No:     Next Visit Date:  Future Appointments   Date Time Provider Department Center   8/12/2024  7:30 AM Nadine Ricci, APRN - CNP [facility]       Health Maintenance   Topic Date Due    Diabetic retinal exam  06/16/2024    Colorectal Cancer Screen  07/01/2024    Shingles vaccine (1 of 2) 02/09/2025 (Originally 9/21/2013)    Pneumococcal 0-64 years Vaccine (1 of 2 - PCV) 02/09/2025 (Originally 9/21/1969)    COVID-19 Vaccine (1) 02/09/2025 (Originally 3/21/1966)    Respiratory Syncytial Virus (RSV) Pregnant or age 60 yrs+ (1 - 1-dose 60+ series) 02/09/2025 (Originally 9/21/2023)    Flu vaccine (1) 08/01/2024    A1C test (Diabetic or Prediabetic)  08/10/2024    Diabetic foot exam  02/09/2025    Depression Screen  02/09/2025    Lipids  02/23/2025    Diabetic Alb to Cr ratio (uACR) test  03/29/2025    GFR test (Diabetes, CKD 3-4, OR last GFR 15-59)  03/29/2025    Breast cancer screen  03/07/2026    Cervical cancer screen  01/25/2028    DTaP/Tdap/Td vaccine (3 - Td or Tdap) 01/28/2030    Hepatitis C screen  Completed    HIV screen  Completed    Hepatitis A vaccine  Aged Out    Hepatitis B vaccine  Aged Out    Hib vaccine  Aged Out    Polio vaccine  Aged Out    Meningococcal (ACWY) vaccine  Aged Out       Hemoglobin A1C (%)   Date Value   05/10/2024 7.7   02/09/2024 8.1   07/18/2023 7.4             ( goal A1C is < 7)   No components found for: \"LABMICR\"  No components found for: \"LDLCHOLESTEROL\", \"LDLCALC\"    (goal LDL is <100)   AST (U/L)   Date Value   03/29/2024 20     ALT (U/L)   Date Value   03/29/2024 20     BUN (mg/dL)   Date Value   03/29/2024 9     BP Readings from Last 3 Encounters:   05/10/24 136/70   02/09/24 136/78   07/18/23 124/70          (goal 120/80)    All Future Testing planned in [facility]  Lab Frequency Next Occurrence               Patient Active Problem List:     Metatarsalgia of

## 2024-07-15 ENCOUNTER — HOSPITAL ENCOUNTER (OUTPATIENT)
Age: 59
Setting detail: OUTPATIENT SURGERY
Discharge: HOME | End: 2024-07-15
Attending: INTERNAL MEDICINE | Admitting: INTERNAL MEDICINE
Payer: MEDICARE

## 2024-07-15 ENCOUNTER — ANESTHESIA (OUTPATIENT)
Dept: OPERATING ROOM | Age: 59
End: 2024-07-15
Payer: MEDICARE

## 2024-07-15 ENCOUNTER — ANESTHESIA EVENT (OUTPATIENT)
Dept: OPERATING ROOM | Age: 59
End: 2024-07-15
Payer: MEDICARE

## 2024-07-15 VITALS
HEIGHT: 67 IN | RESPIRATION RATE: 13 BRPM | DIASTOLIC BLOOD PRESSURE: 72 MMHG | HEART RATE: 71 BPM | SYSTOLIC BLOOD PRESSURE: 122 MMHG | OXYGEN SATURATION: 96 % | WEIGHT: 189.9 LBS | TEMPERATURE: 97.3 F | BODY MASS INDEX: 29.81 KG/M2

## 2024-07-15 DIAGNOSIS — Z86.010 HX OF COLONIC POLYPS: ICD-10-CM

## 2024-07-15 DIAGNOSIS — Z12.11 SCREEN FOR COLON CANCER: ICD-10-CM

## 2024-07-15 LAB — GLUCOSE BLD-MCNC: 191 MG/DL (ref 65–105)

## 2024-07-15 PROCEDURE — 2580000003 HC RX 258: Performed by: ANESTHESIOLOGY

## 2024-07-15 PROCEDURE — 82947 ASSAY GLUCOSE BLOOD QUANT: CPT

## 2024-07-15 PROCEDURE — 88305 TISSUE EXAM BY PATHOLOGIST: CPT

## 2024-07-15 PROCEDURE — 7100000010 HC PHASE II RECOVERY - FIRST 15 MIN: Performed by: INTERNAL MEDICINE

## 2024-07-15 PROCEDURE — 45385 COLONOSCOPY W/LESION REMOVAL: CPT | Performed by: INTERNAL MEDICINE

## 2024-07-15 PROCEDURE — 2709999900 HC NON-CHARGEABLE SUPPLY: Performed by: INTERNAL MEDICINE

## 2024-07-15 PROCEDURE — 3700000000 HC ANESTHESIA ATTENDED CARE: Performed by: INTERNAL MEDICINE

## 2024-07-15 PROCEDURE — 6360000002 HC RX W HCPCS: Performed by: NURSE ANESTHETIST, CERTIFIED REGISTERED

## 2024-07-15 PROCEDURE — 3609010600 HC COLONOSCOPY POLYPECTOMY SNARE/COLD BIOPSY: Performed by: INTERNAL MEDICINE

## 2024-07-15 PROCEDURE — C1889 IMPLANT/INSERT DEVICE, NOC: HCPCS | Performed by: INTERNAL MEDICINE

## 2024-07-15 PROCEDURE — 3700000001 HC ADD 15 MINUTES (ANESTHESIA): Performed by: INTERNAL MEDICINE

## 2024-07-15 PROCEDURE — 7100000011 HC PHASE II RECOVERY - ADDTL 15 MIN: Performed by: INTERNAL MEDICINE

## 2024-07-15 DEVICE — WORKING LENGTH 235CM, WORKING CHANNEL 2.8MM
Type: IMPLANTABLE DEVICE | Site: OTHER | Status: FUNCTIONAL
Brand: RESOLUTION 360 CLIP

## 2024-07-15 RX ORDER — METOCLOPRAMIDE HYDROCHLORIDE 5 MG/ML
10 INJECTION INTRAMUSCULAR; INTRAVENOUS
Status: CANCELLED | OUTPATIENT
Start: 2024-07-15 | End: 2024-07-16

## 2024-07-15 RX ORDER — SODIUM CHLORIDE, SODIUM LACTATE, POTASSIUM CHLORIDE, CALCIUM CHLORIDE 600; 310; 30; 20 MG/100ML; MG/100ML; MG/100ML; MG/100ML
INJECTION, SOLUTION INTRAVENOUS CONTINUOUS
Status: DISCONTINUED | OUTPATIENT
Start: 2024-07-15 | End: 2024-07-15 | Stop reason: HOSPADM

## 2024-07-15 RX ORDER — SODIUM CHLORIDE 9 MG/ML
INJECTION, SOLUTION INTRAVENOUS CONTINUOUS
Status: DISCONTINUED | OUTPATIENT
Start: 2024-07-15 | End: 2024-07-15 | Stop reason: HOSPADM

## 2024-07-15 RX ORDER — HALOPERIDOL 5 MG/ML
1 INJECTION INTRAMUSCULAR
Status: CANCELLED | OUTPATIENT
Start: 2024-07-15 | End: 2024-07-16

## 2024-07-15 RX ORDER — HYDROMORPHONE HYDROCHLORIDE 1 MG/ML
0.5 INJECTION, SOLUTION INTRAMUSCULAR; INTRAVENOUS; SUBCUTANEOUS EVERY 5 MIN PRN
Status: CANCELLED | OUTPATIENT
Start: 2024-07-15

## 2024-07-15 RX ORDER — OXYCODONE HYDROCHLORIDE 5 MG/1
5 TABLET ORAL
Status: CANCELLED | OUTPATIENT
Start: 2024-07-15 | End: 2024-07-16

## 2024-07-15 RX ORDER — LIDOCAINE HYDROCHLORIDE 10 MG/ML
1 INJECTION, SOLUTION EPIDURAL; INFILTRATION; INTRACAUDAL; PERINEURAL
Status: DISCONTINUED | OUTPATIENT
Start: 2024-07-16 | End: 2024-07-15 | Stop reason: HOSPADM

## 2024-07-15 RX ORDER — NALOXONE HYDROCHLORIDE 0.4 MG/ML
INJECTION, SOLUTION INTRAMUSCULAR; INTRAVENOUS; SUBCUTANEOUS PRN
Status: CANCELLED | OUTPATIENT
Start: 2024-07-15

## 2024-07-15 RX ORDER — FENTANYL CITRATE 50 UG/ML
25 INJECTION, SOLUTION INTRAMUSCULAR; INTRAVENOUS EVERY 5 MIN PRN
Status: CANCELLED | OUTPATIENT
Start: 2024-07-15

## 2024-07-15 RX ORDER — SODIUM CHLORIDE 0.9 % (FLUSH) 0.9 %
5-40 SYRINGE (ML) INJECTION EVERY 12 HOURS SCHEDULED
Status: CANCELLED | OUTPATIENT
Start: 2024-07-15

## 2024-07-15 RX ORDER — SODIUM CHLORIDE 0.9 % (FLUSH) 0.9 %
5-40 SYRINGE (ML) INJECTION PRN
Status: CANCELLED | OUTPATIENT
Start: 2024-07-15

## 2024-07-15 RX ORDER — SODIUM CHLORIDE 9 MG/ML
INJECTION, SOLUTION INTRAVENOUS PRN
Status: CANCELLED | OUTPATIENT
Start: 2024-07-15

## 2024-07-15 RX ORDER — PROPOFOL 10 MG/ML
INJECTION, EMULSION INTRAVENOUS CONTINUOUS PRN
Status: DISCONTINUED | OUTPATIENT
Start: 2024-07-15 | End: 2024-07-15 | Stop reason: SDUPTHER

## 2024-07-15 RX ADMIN — PROPOFOL 350 MCG/KG/MIN: 10 INJECTION, EMULSION INTRAVENOUS at 10:20

## 2024-07-15 RX ADMIN — SODIUM CHLORIDE, POTASSIUM CHLORIDE, SODIUM LACTATE AND CALCIUM CHLORIDE: 600; 310; 30; 20 INJECTION, SOLUTION INTRAVENOUS at 09:11

## 2024-07-15 ASSESSMENT — PAIN - FUNCTIONAL ASSESSMENT: PAIN_FUNCTIONAL_ASSESSMENT: 0-10

## 2024-07-15 NOTE — H&P
GI History and Physical    Pt Name: Kimber Hale  MRN: [id number]  YOB: 1965  Date of evaluation: 7/15/2024  Primary Care Physician: Nadine Ricci APRN - CNP    SUBJECTIVE:   History of Chief Complaint:    This is Kimber Hale a 58 y.o. female who presents today for a COLONOSCOPY  by Dr LAGUNAS  for COLORECTAL CANCER SCREENING..   The patient completed the prep as directed Yes. Bowel movements have not significantly changed The patient does not have a family history of colon cancer or polyps. Previous colonoscopy Yes.  7/1/14.  Biopsies were not taken.     Patient today denies  fever, chills, night sweats, pain or unexplained weight loss. SHE DOES NOT TAKE BLOOD THINNERS.SHE DOES HHAVE DIABETES.BLOOD SUGAR TODAY     Past Medical History    has a past medical history of Diabetes mellitus (HCC), History of abnormal cervical Pap smear, Hyperlipidemia, and Palpitations.  Past Surgical History   has a past surgical history that includes Breast surgery (Bilateral); Bunionectomy (Bilateral); Colonoscopy (7/1/14); Breast reduction surgery; Foot surgery (Left, 01/20/2022); and arthrodesis (Left, 1/20/2022).  Medications  Prior to Admission medications    Medication Sig Start Date End Date Taking? Authorizing Provider   glimepiride (AMARYL) 1 MG tablet take 2 tablets by mouth every morning 7/8/24   Nadine Ricci APRN - CNP   dapagliflozin (FARXIGA) 5 MG tablet Take 1 tablet by mouth every morning  Patient not taking: Reported on 7/15/2024 6/10/24   Nadine Ricci APRN - CNP   Semaglutide,0.25 or 0.5MG/DOS, (OZEMPIC, 0.25 OR 0.5 MG/DOSE,) 2 MG/3ML SOPN Inject 0.5 mg into the skin once a week  Patient not taking: Reported on 7/15/2024 6/21/24   Nadine Ricci APRN - CNP   atorvastatin (LIPITOR) 40 MG tablet Take 1 tablet by mouth daily 5/10/24   Nadine Ricci APRN - CNP   metFORMIN (GLUCOPHAGE) 1000 MG tablet Take 1 tablet by mouth 2 times daily (with meals) 5/10/24

## 2024-07-15 NOTE — OP NOTE
PROCEDURE NOTE    DATE OF PROCEDURE: 7/15/2024    SURGEON: Herman Zambrano MD    ASSISTANT: None    PREOPERATIVE DIAGNOSIS: SCREENING  HX OF COLON POLYPS    POSTOPERATIVE DIAGNOSIS: as described below    OPERATION: Total colonoscopy   SNARE POLYPECTOMY   HEMO CLIP APPLICATION     ANESTHESIA: MAC PER ANESTHESIA     ESTIMATED BLOOD LOSS: less than 50     COMPLICATIONS: None.     SPECIMENS:  Was Obtained:     HISTORY: The patient is a 58 y.o. year old female with history of above preop diagnosis.  I recommended colonoscopy with possible biopsy or polypectomy and I explained the risk, benefits, expected outcome, and alternatives to the procedure.  Risks included but are not limited to bleeding, infection, respiratory distress, hypotension, and perforation of the colon and possibility of missing a lesion.  The patient understands and is in agreement.      PROCEDURE: The patient was given IV conscious sedation.  The patient's SPO2 remained above 90% throughout the procedure. The colonoscope was inserted per rectum and advanced under direct vision to the cecum without difficulty.  The prep was fair TO GOOD  RETAINED PASTY STOOLS    Findings:  Terminal ileum: normal    Cecum/Ascending colon: normal    Transverse colon: abnormal: AT HEP FLEX A ONE CM POLYP WITH BROAD BASE WAS REMOVED WITH COLD SNARE  POLYPECTOMY BASE GAP WAS CLOSED WITH APPLICATION OF HEMO CLIP    Descending/Sigmoid colon: normal    Rectum/Anus: examined in normal and retroflexed positions and was abnormal: INT HEMORRHOIDS    Withdrawal Time was (minutes): 18    The colon was decompressed and the scope was removed.  The patient tolerated the procedure well.     Recommendations/Plan:   Lifestyle and dietary modifications as discussed  F/U Biopsies  F/U In Office in 3-4 weeks  Discussed with the family  Colonoscopy Recall :2-3 YEARS WITH BETTER PREP  POST SEDATION PATIENT WAS STABLE WITH STABLE VITAL SIGNS AND OXYGEN SATURATIONS AND WAS DISCHARGED HOME WITH

## 2024-07-15 NOTE — ANESTHESIA PRE PROCEDURE
Anesthesia Plan      MAC     ASA 3       Induction: intravenous.    MIPS: Prophylactic antiemetics administered.  Anesthetic plan and risks discussed with patient.      Plan discussed with CRNA.    Attending anesthesiologist reviewed and agrees with Preprocedure content                CHIDI DAN MD   7/15/2024

## 2024-07-15 NOTE — ANESTHESIA POSTPROCEDURE EVALUATION
Department of Anesthesiology  Postprocedure Note    Patient: Kimber Hale  MRN: [id number]  YOB: 1965  Date of evaluation: 7/15/2024    Procedure Summary       Date: 07/15/24 Room / Location: [address]    Anesthesia Start: 1020 Anesthesia Stop: 1050    Procedure: COLORECTAL CANCER SCREENING, COLD SNARE POLYPECTOMY (Rectum) Diagnosis:       Screen for colon cancer      Hx of colonic polyps      (Screen for colon cancer [Z12.11])      (Hx of colonic polyps [Z86.010])    Surgeons: Herman Zambrano MD Responsible Provider: Aurora Bhatt MD    Anesthesia Type: MAC ASA Status: 3            Anesthesia Type: No value filed.    Issac Phase I:      Issac Phase II: Issac Score: 10    Anesthesia Post Evaluation    Patient location during evaluation: PACU  Patient participation: complete - patient participated  Level of consciousness: awake  Airway patency: patent  Nausea & Vomiting: no nausea  Cardiovascular status: blood pressure returned to baseline  Respiratory status: acceptable  Hydration status: euvolemic  Comments: Multimodal analgesia pain management as indicated by procedure  Multimodal analgesia pain management approach  Pain management: adequate    No notable events documented.

## 2024-07-17 LAB — SURGICAL PATHOLOGY REPORT: NORMAL

## 2024-08-11 SDOH — ECONOMIC STABILITY: FOOD INSECURITY: WITHIN THE PAST 12 MONTHS, THE FOOD YOU BOUGHT JUST DIDN'T LAST AND YOU DIDN'T HAVE MONEY TO GET MORE.: NEVER TRUE

## 2024-08-11 SDOH — ECONOMIC STABILITY: INCOME INSECURITY: HOW HARD IS IT FOR YOU TO PAY FOR THE VERY BASICS LIKE FOOD, HOUSING, MEDICAL CARE, AND HEATING?: NOT HARD AT ALL

## 2024-08-11 SDOH — ECONOMIC STABILITY: FOOD INSECURITY: WITHIN THE PAST 12 MONTHS, YOU WORRIED THAT YOUR FOOD WOULD RUN OUT BEFORE YOU GOT MONEY TO BUY MORE.: NEVER TRUE

## 2024-08-12 ENCOUNTER — OFFICE VISIT (OUTPATIENT)
Dept: FAMILY MEDICINE CLINIC | Age: 59
End: 2024-08-12
Payer: COMMERCIAL

## 2024-08-12 VITALS
OXYGEN SATURATION: 98 % | DIASTOLIC BLOOD PRESSURE: 70 MMHG | HEART RATE: 70 BPM | BODY MASS INDEX: 30.23 KG/M2 | SYSTOLIC BLOOD PRESSURE: 130 MMHG | WEIGHT: 193 LBS

## 2024-08-12 DIAGNOSIS — E11.9 TYPE 2 DIABETES MELLITUS WITHOUT COMPLICATION, WITHOUT LONG-TERM CURRENT USE OF INSULIN (HCC): Primary | ICD-10-CM

## 2024-08-12 LAB — HBA1C MFR BLD: 7.6 %

## 2024-08-12 PROCEDURE — 83036 HEMOGLOBIN GLYCOSYLATED A1C: CPT | Performed by: NURSE PRACTITIONER

## 2024-08-12 PROCEDURE — 3051F HG A1C>EQUAL 7.0%<8.0%: CPT | Performed by: NURSE PRACTITIONER

## 2024-08-12 PROCEDURE — 99214 OFFICE O/P EST MOD 30 MIN: CPT | Performed by: NURSE PRACTITIONER

## 2024-08-12 RX ORDER — ATORVASTATIN CALCIUM 40 MG/1
40 TABLET, FILM COATED ORAL DAILY
Qty: 90 TABLET | Refills: 1 | Status: SHIPPED | OUTPATIENT
Start: 2024-08-12

## 2024-08-12 RX ORDER — IBUPROFEN 800 MG/1
800 TABLET ORAL 3 TIMES DAILY PRN
Qty: 270 TABLET | Refills: 1 | Status: SHIPPED | OUTPATIENT
Start: 2024-08-12

## 2024-08-12 ASSESSMENT — ENCOUNTER SYMPTOMS
SHORTNESS OF BREATH: 0
COUGH: 0

## 2024-08-12 NOTE — PROGRESS NOTES
Kimber Hale (:  1965) is a 58 y.o. female,Established patient, here for evaluation of the following chief complaint(s):  Diabetes and 3 Month Follow-Up      Assessment & Plan   ASSESSMENT/PLAN:  1. Type 2 diabetes mellitus without complication, without long-term current use of insulin ([facility])  -     POCT glycosylated hemoglobin (Hb A1C)  -     atorvastatin (LIPITOR) 40 MG tablet; Take 1 tablet by mouth daily, Disp-90 tablet, R-1Normal  -     Continuous Glucose Sensor (FREESTYLE MARIA M 2 SENSOR) [facility]; Inject 1 each into the skin every 14 days, Disp-2 each, R-5Normal  -     metFORMIN (GLUCOPHAGE) 1000 MG tablet; Take 1 tablet by mouth 2 times daily (with meals), Disp-180 tablet, R-1Normal  Kimber will call her insurance company and see if another diabetes medication is covered better than the Farxiga.    No follow-ups on file.         Subjective   SUBJECTIVE/OBJECTIVE:  Diabetes  Pertinent negatives for diabetes include no chest pain.   Just started the farxiga two weeks ago. Stopped the glimperide- states she thought I told her to do this, we did not have this conversation.  Discussed how different diabetes medications work and how often times multiple different classes of drugs need to be added on to get A1c to goal.  Discussed goal of less than 7.  Farxiga is expensive.  She is going to call her insurance company to see if something is covered better.  Discussed that we can add on insulin.  She does not want to do this.  States she just had her eye exam.     Review of Systems   Constitutional:  Negative for chills and fever.   Respiratory:  Negative for cough and shortness of breath.    Cardiovascular:  Negative for chest pain, palpitations and leg swelling.     Objective   Vitals:    24 0729   BP: 130/70   Pulse: 70   SpO2: 98%     Wt Readings from Last 3 Encounters:   24 87.5 kg (193 lb)   07/15/24 86.1 kg (189 lb 14.4 oz)   05/10/24 90.3 kg (199 lb)       Physical Exam  Constitutional:

## 2024-11-12 ENCOUNTER — OFFICE VISIT (OUTPATIENT)
Dept: FAMILY MEDICINE CLINIC | Age: 59
End: 2024-11-12
Payer: COMMERCIAL

## 2024-11-12 VITALS
BODY MASS INDEX: 30.23 KG/M2 | HEART RATE: 66 BPM | SYSTOLIC BLOOD PRESSURE: 124 MMHG | DIASTOLIC BLOOD PRESSURE: 70 MMHG | WEIGHT: 193 LBS | OXYGEN SATURATION: 98 %

## 2024-11-12 DIAGNOSIS — M25.551 RIGHT HIP PAIN: ICD-10-CM

## 2024-11-12 DIAGNOSIS — E11.9 TYPE 2 DIABETES MELLITUS WITHOUT COMPLICATION, WITHOUT LONG-TERM CURRENT USE OF INSULIN (HCC): Primary | ICD-10-CM

## 2024-11-12 LAB — HBA1C MFR BLD: 7.7 %

## 2024-11-12 PROCEDURE — 3017F COLORECTAL CA SCREEN DOC REV: CPT | Performed by: NURSE PRACTITIONER

## 2024-11-12 PROCEDURE — 1036F TOBACCO NON-USER: CPT | Performed by: NURSE PRACTITIONER

## 2024-11-12 PROCEDURE — 83036 HEMOGLOBIN GLYCOSYLATED A1C: CPT | Performed by: NURSE PRACTITIONER

## 2024-11-12 PROCEDURE — G8417 CALC BMI ABV UP PARAM F/U: HCPCS | Performed by: NURSE PRACTITIONER

## 2024-11-12 PROCEDURE — 3051F HG A1C>EQUAL 7.0%<8.0%: CPT | Performed by: NURSE PRACTITIONER

## 2024-11-12 PROCEDURE — 2022F DILAT RTA XM EVC RTNOPTHY: CPT | Performed by: NURSE PRACTITIONER

## 2024-11-12 PROCEDURE — G8484 FLU IMMUNIZE NO ADMIN: HCPCS | Performed by: NURSE PRACTITIONER

## 2024-11-12 PROCEDURE — 99214 OFFICE O/P EST MOD 30 MIN: CPT | Performed by: NURSE PRACTITIONER

## 2024-11-12 PROCEDURE — G8427 DOCREV CUR MEDS BY ELIG CLIN: HCPCS | Performed by: NURSE PRACTITIONER

## 2024-11-12 ASSESSMENT — ENCOUNTER SYMPTOMS
SHORTNESS OF BREATH: 0
COUGH: 0

## 2024-11-12 NOTE — PROGRESS NOTES
Kimber Hale (:  1965) is a 59 y.o. female,Established patient, here for evaluation of the following chief complaint(s):  Diabetes and 3 Month Follow-Up         Assessment & Plan  Type 2 diabetes mellitus without complication, without long-term current use of insulin ([facility])   Will switch to jardiance to see if this is better covered/works better for her.     Orders:    POCT glycosylated hemoglobin (Hb A1C)    empagliflozin (JARDIANCE) 10 MG tablet; Take 1 tablet by mouth daily    Right hip pain   Exercises provided.            No follow-ups on file.       Subjective   Diabetes  Pertinent negatives for diabetes include no chest pain.     Dm- using janet. Taking farxiga and glimperide. A1c is 7.7.  janet shows she is in range the majority of the time.     Right hip pain.  Feels like it catches and then it goes away for a while.  The pain will go all the way down to her knee.      Review of Systems   Constitutional:  Negative for chills and fever.   Respiratory:  Negative for cough and shortness of breath.    Cardiovascular:  Negative for chest pain, palpitations and leg swelling.        Objective   Vitals:    24 0751   BP: 124/70   Pulse: 66   SpO2: 98%     Wt Readings from Last 3 Encounters:   24 87.5 kg (193 lb)   24 87.5 kg (193 lb)   07/15/24 86.1 kg (189 lb 14.4 oz)       Physical Exam  Constitutional:       Appearance: She is well-developed.   HENT:      Right Ear: External ear normal.      Left Ear: External ear normal.      Nose: Nose normal.   Cardiovascular:      Rate and Rhythm: Normal rate and regular rhythm.      Heart sounds: Normal heart sounds, S1 normal and S2 normal.   Pulmonary:      Effort: Pulmonary effort is normal. No respiratory distress.      Breath sounds: Normal breath sounds.   Musculoskeletal:         General: No deformity. Normal range of motion.      Cervical back: Full passive range of motion without pain and normal range of motion.   Skin:     General:

## 2024-11-12 NOTE — ASSESSMENT & PLAN NOTE
Will switch to jardiance to see if this is better covered/works better for her.     Orders:    POCT glycosylated hemoglobin (Hb A1C)    empagliflozin (JARDIANCE) 10 MG tablet; Take 1 tablet by mouth daily

## 2024-11-27 DIAGNOSIS — E11.9 TYPE 2 DIABETES MELLITUS WITHOUT COMPLICATION, WITHOUT LONG-TERM CURRENT USE OF INSULIN (HCC): ICD-10-CM

## 2024-11-27 RX ORDER — GLIMEPIRIDE 1 MG/1
2 TABLET ORAL EVERY MORNING
Qty: 180 TABLET | Refills: 1 | Status: SHIPPED | OUTPATIENT
Start: 2024-11-27

## 2024-11-27 NOTE — TELEPHONE ENCOUNTER
See patient comment. Patient is asking if this can be changed to a 2mg tablet.  Currently taking 1mg-2 tablets daily.

## 2024-12-05 RX ORDER — DAPAGLIFLOZIN 5 MG/1
5 TABLET, FILM COATED ORAL EVERY MORNING
Qty: 30 TABLET | Refills: 3 | Status: SHIPPED | OUTPATIENT
Start: 2024-12-05

## 2025-01-21 ENCOUNTER — OFFICE VISIT (OUTPATIENT)
Dept: OBGYN CLINIC | Age: 60
End: 2025-01-21
Payer: COMMERCIAL

## 2025-01-21 VITALS
SYSTOLIC BLOOD PRESSURE: 121 MMHG | HEIGHT: 67 IN | HEART RATE: 74 BPM | DIASTOLIC BLOOD PRESSURE: 71 MMHG | BODY MASS INDEX: 29.51 KG/M2 | WEIGHT: 188 LBS

## 2025-01-21 DIAGNOSIS — Z12.31 SCREENING MAMMOGRAM FOR BREAST CANCER: ICD-10-CM

## 2025-01-21 DIAGNOSIS — Z01.419 WELL WOMAN EXAM WITH ROUTINE GYNECOLOGICAL EXAM: Primary | ICD-10-CM

## 2025-01-21 PROCEDURE — 99459 PELVIC EXAMINATION: CPT | Performed by: STUDENT IN AN ORGANIZED HEALTH CARE EDUCATION/TRAINING PROGRAM

## 2025-01-21 PROCEDURE — 99396 PREV VISIT EST AGE 40-64: CPT | Performed by: STUDENT IN AN ORGANIZED HEALTH CARE EDUCATION/TRAINING PROGRAM

## 2025-01-21 NOTE — PROGRESS NOTES
[facility]  [facility]  [address]  [place] [address]      Kimber Hale  2027                       Primary Care Physician: Nadine Ricci APRN - CNP    CC:   Chief Complaint   Patient presents with    Annual Exam         HPI: Kimber Hale is a 59 y.o. female No obstetric history on file. Patient's last menstrual period was 2016 (approximate).    The patient was seen and examined. She is here for an annual visit. She is complaining of nothing.     Patient is menopausal and denies any bleeding since that time.     Her bowel habits are regular. She denies any bloating.  She denies dysuria. She denies urinary leaking.  She denies vaginal discharge.      Depression Screen: Symptoms of decreased mood absent  Symptoms of anhedonia absent  **If either question is answered in a  positive fashion then complete the PHQ9 Scoring Evaluation and make the appropriate referral**    REVIEW OF SYSTEMS:   Constitutional: negative fever, negative chills  HEENT: negative visual disturbances, negative headaches  Respiratory: negative dyspnea, negative cough  Cardiovascular: negative chest pain,  negative palpitations  Gastrointestinal: negative abdominal pain, negative RUQ pain, negative N/V, negative diarrhea, negative constipation  Genitourinary: negative dysuria, negative vaginal discharge  Dermatological: negative rash  Hematologic: negative bruising  Immunologic/Lymphatic: negative recent illness, negative recent sick contact  Musculoskeletal: negative back pain, negative myalgias, negative arthralgias  Neurological:  negative dizziness, negative weakness  Behavior/Psych: negative depression, negative anxiety      GYNECOLOGICAL HISTORY:  Age of Menarche: 10  Age of Menopause: 50     STD History: no past history    Permanent Sterilization: no  Reversible Birth Control: no  Hormone Replacement Exposure: no    OBSTETRICAL HISTORY:  OB History    Para Term

## 2025-01-21 NOTE — PROGRESS NOTES
Chaperone for Intimate Exam  Chaperone was offered as part of the rooming process. Patient declined and agrees to continue with exam without a chaperone.  Chaperone: none

## 2025-02-09 DIAGNOSIS — E11.9 TYPE 2 DIABETES MELLITUS WITHOUT COMPLICATION, WITHOUT LONG-TERM CURRENT USE OF INSULIN (HCC): ICD-10-CM

## 2025-02-10 NOTE — TELEPHONE ENCOUNTER
Last visit: 11/12/24  Last Med refill: 8/12/24  Does patient have enough medication for 72 hours: No:     Next Visit Date:  Future Appointments   Date Time Provider Department Center   2/17/2025  8:00 AM Nadine Ricci, APRN - CNP [facility]   1/26/2026 10:00 AM Danyelle Hyman DO [facility]       Health Maintenance   Topic Date Due    Pneumococcal 50+ years Vaccine (1 of 2 - PCV) Never done    Shingles vaccine (1 of 2) Never done    Diabetic retinal exam  06/16/2024    COVID-19 Vaccine (1 - 2024-25 season) Never done    Diabetic foot exam  02/09/2025    Depression Screen  02/09/2025    A1C test (Diabetic or Prediabetic)  02/12/2025    Lipids  02/23/2025    Flu vaccine (1) 11/12/2025 (Originally 8/1/2024)    Diabetic Alb to Cr ratio (uACR) test  03/29/2025    GFR test (Diabetes, CKD 3-4, OR last GFR 15-59)  03/29/2025    Breast cancer screen  03/07/2026    Colorectal Cancer Screen  07/15/2026    Cervical cancer screen  01/25/2028    DTaP/Tdap/Td vaccine (3 - Td or Tdap) 01/28/2030    Respiratory Syncytial Virus (RSV) Pregnant or age 60 yrs+ (1 - 1-dose 75+ series) 09/21/2038    Hepatitis C screen  Completed    HIV screen  Completed    Hepatitis A vaccine  Aged Out    Hepatitis B vaccine  Aged Out    Hib vaccine  Aged Out    Polio vaccine  Aged Out    Meningococcal (ACWY) vaccine  Aged Out       Hemoglobin A1C (%)   Date Value   11/12/2024 7.7   08/12/2024 7.6   05/10/2024 7.7             ( goal A1C is < 7)   No components found for: \"LABMICR\"  No components found for: \"LDLCHOLESTEROL\", \"LDLCALC\"    (goal LDL is <100)   AST (U/L)   Date Value   03/29/2024 20     ALT (U/L)   Date Value   03/29/2024 20     BUN (mg/dL)   Date Value   03/29/2024 9     BP Readings from Last 3 Encounters:   01/21/25 121/71   11/12/24 124/70   08/12/24 130/70          (goal 120/80)    All Future Testing planned in [facility]  Lab Frequency Next Occurrence   [facility] DIGITAL SCREEN W OR WO CAD BILATERAL Once

## 2025-02-14 SDOH — ECONOMIC STABILITY: INCOME INSECURITY: IN THE LAST 12 MONTHS, WAS THERE A TIME WHEN YOU WERE NOT ABLE TO PAY THE MORTGAGE OR RENT ON TIME?: NO

## 2025-02-14 SDOH — ECONOMIC STABILITY: FOOD INSECURITY: WITHIN THE PAST 12 MONTHS, THE FOOD YOU BOUGHT JUST DIDN'T LAST AND YOU DIDN'T HAVE MONEY TO GET MORE.: NEVER TRUE

## 2025-02-14 SDOH — ECONOMIC STABILITY: FOOD INSECURITY: WITHIN THE PAST 12 MONTHS, YOU WORRIED THAT YOUR FOOD WOULD RUN OUT BEFORE YOU GOT MONEY TO BUY MORE.: NEVER TRUE

## 2025-02-14 SDOH — ECONOMIC STABILITY: TRANSPORTATION INSECURITY
IN THE PAST 12 MONTHS, HAS LACK OF TRANSPORTATION KEPT YOU FROM MEETINGS, WORK, OR FROM GETTING THINGS NEEDED FOR DAILY LIVING?: NO

## 2025-02-14 SDOH — ECONOMIC STABILITY: TRANSPORTATION INSECURITY
IN THE PAST 12 MONTHS, HAS THE LACK OF TRANSPORTATION KEPT YOU FROM MEDICAL APPOINTMENTS OR FROM GETTING MEDICATIONS?: NO

## 2025-02-14 ASSESSMENT — PATIENT HEALTH QUESTIONNAIRE - PHQ9
SUM OF ALL RESPONSES TO PHQ QUESTIONS 1-9: 0
SUM OF ALL RESPONSES TO PHQ9 QUESTIONS 1 & 2: 0
2. FEELING DOWN, DEPRESSED OR HOPELESS: NOT AT ALL
SUM OF ALL RESPONSES TO PHQ QUESTIONS 1-9: 0
1. LITTLE INTEREST OR PLEASURE IN DOING THINGS: NOT AT ALL
2. FEELING DOWN, DEPRESSED OR HOPELESS: NOT AT ALL
SUM OF ALL RESPONSES TO PHQ9 QUESTIONS 1 & 2: 0
1. LITTLE INTEREST OR PLEASURE IN DOING THINGS: NOT AT ALL

## 2025-02-17 ENCOUNTER — OFFICE VISIT (OUTPATIENT)
Dept: FAMILY MEDICINE CLINIC | Age: 60
End: 2025-02-17
Payer: COMMERCIAL

## 2025-02-17 VITALS
DIASTOLIC BLOOD PRESSURE: 74 MMHG | SYSTOLIC BLOOD PRESSURE: 130 MMHG | WEIGHT: 188 LBS | HEART RATE: 84 BPM | OXYGEN SATURATION: 98 % | BODY MASS INDEX: 29.44 KG/M2

## 2025-02-17 DIAGNOSIS — R09.82 PND (POST-NASAL DRIP): ICD-10-CM

## 2025-02-17 DIAGNOSIS — E11.9 TYPE 2 DIABETES MELLITUS WITHOUT COMPLICATION, WITHOUT LONG-TERM CURRENT USE OF INSULIN (HCC): Primary | ICD-10-CM

## 2025-02-17 LAB — HBA1C MFR BLD: 7.3 %

## 2025-02-17 PROCEDURE — 2022F DILAT RTA XM EVC RTNOPTHY: CPT | Performed by: NURSE PRACTITIONER

## 2025-02-17 PROCEDURE — 99214 OFFICE O/P EST MOD 30 MIN: CPT | Performed by: NURSE PRACTITIONER

## 2025-02-17 PROCEDURE — 1036F TOBACCO NON-USER: CPT | Performed by: NURSE PRACTITIONER

## 2025-02-17 PROCEDURE — G8427 DOCREV CUR MEDS BY ELIG CLIN: HCPCS | Performed by: NURSE PRACTITIONER

## 2025-02-17 PROCEDURE — G8417 CALC BMI ABV UP PARAM F/U: HCPCS | Performed by: NURSE PRACTITIONER

## 2025-02-17 PROCEDURE — 83036 HEMOGLOBIN GLYCOSYLATED A1C: CPT | Performed by: NURSE PRACTITIONER

## 2025-02-17 PROCEDURE — 3051F HG A1C>EQUAL 7.0%<8.0%: CPT | Performed by: NURSE PRACTITIONER

## 2025-02-17 PROCEDURE — 3017F COLORECTAL CA SCREEN DOC REV: CPT | Performed by: NURSE PRACTITIONER

## 2025-02-17 RX ORDER — FLUTICASONE PROPIONATE 50 MCG
1 SPRAY, SUSPENSION (ML) NASAL DAILY
Qty: 32 G | Refills: 1 | Status: SHIPPED | OUTPATIENT
Start: 2025-02-17

## 2025-02-17 RX ORDER — ATORVASTATIN CALCIUM 40 MG/1
40 TABLET, FILM COATED ORAL DAILY
Qty: 90 TABLET | Refills: 1 | Status: SHIPPED | OUTPATIENT
Start: 2025-02-17

## 2025-02-17 ASSESSMENT — ENCOUNTER SYMPTOMS
COUGH: 0
SHORTNESS OF BREATH: 0

## 2025-02-17 NOTE — PROGRESS NOTES
No deformity. Normal range of motion.      Cervical back: Full passive range of motion without pain and normal range of motion.   Skin:     General: Skin is warm and dry.   Neurological:      Mental Status: She is alert and oriented to person, place, and time.          Assessment/Plan:      1. Type 2 diabetes mellitus without complication, without long-term current use of insulin ([facility])  -     POCT glycosylated hemoglobin (Hb A1C)  The following orders have not been finalized:  -     Continuous Glucose Sensor (FREESTYLE MARIA M 2 SENSOR) MISC  -     atorvastatin (LIPITOR) 40 MG tablet       Assessment & Plan  1. Diabetes Mellitus.  Her A1c has shown improvement, decreasing from 7.7 to 7.3. She reports fasting blood sugars between 100-125 mg/dL and postprandial readings occasionally reaching 200-230 mg/dL. She has resumed Farxiga due to cost issues with Jardiance. No changes to her current medication regimen are necessary at this time. She is advised to continue her current management plan, including monitoring her blood sugar levels and having a snack before bedtime if needed. Refills for her medications are available at the pharmacy.    2. Postnasal Drip.  She reports persistent coughing and mucous production since a chest cold after [place]. A prescription for Flonase will be sent to [facility] pharmacy to help dry up the postnasal drip. If symptoms do not improve, she may take cetirizine or an over-the-counter allergy medication.    3. Health Maintenance.  She had her yearly gynecological visit about a month ago and is due for an eye exam in June. She is unsure if she received an order for a breast exam. She is advised to call and confirm the order for the breast exam, which is available on [facility].    Follow-up  The patient will follow up in 4 months.  Hemoglobin A1C   Date Value Ref Range Status   11/12/2024 7.7 % Final        No follow-ups on file.  Orders Placed This Encounter   Procedures    POCT glycosylated

## 2025-03-10 ENCOUNTER — HOSPITAL ENCOUNTER (OUTPATIENT)
Dept: MAMMOGRAPHY | Age: 60
Discharge: HOME | End: 2025-03-12
Attending: STUDENT IN AN ORGANIZED HEALTH CARE EDUCATION/TRAINING PROGRAM
Payer: COMMERCIAL

## 2025-03-10 ENCOUNTER — RESULTS FOLLOW-UP (OUTPATIENT)
Dept: MAMMOGRAPHY | Age: 60
End: 2025-03-10

## 2025-03-10 VITALS — WEIGHT: 185 LBS | HEIGHT: 67 IN | BODY MASS INDEX: 29.03 KG/M2

## 2025-03-10 DIAGNOSIS — Z12.31 SCREENING MAMMOGRAM FOR BREAST CANCER: ICD-10-CM

## 2025-03-10 PROCEDURE — 77067 SCR MAMMO BI INCL CAD: CPT

## 2025-04-02 RX ORDER — DAPAGLIFLOZIN 5 MG/1
5 TABLET, FILM COATED ORAL EVERY MORNING
Qty: 30 TABLET | Refills: 5 | Status: SHIPPED | OUTPATIENT
Start: 2025-04-02

## 2025-04-02 NOTE — TELEPHONE ENCOUNTER
Last visit: 2/17/25  Last Med refill: 12/5/24  Does patient have enough medication for 72 hours: Yes    Next Visit Date:  Future Appointments   Date Time Provider Department Center   6/17/2025  7:30 AM Nadine Ricci APRN - CNP [facility]   1/26/2026 10:00 AM Danyelle Hyman DO [facility]       Health Maintenance   Topic Date Due    Pneumococcal 50+ years Vaccine (1 of 2 - PCV) Never done    Shingles vaccine (1 of 2) Never done    Respiratory Syncytial Virus (RSV) Pregnant or age 60 yrs+ (1 - Risk 60-74 years 1-dose series) Never done    Diabetic retinal exam  06/16/2024    COVID-19 Vaccine (1 - 2024-25 season) Never done    Diabetic foot exam  02/09/2025    Lipids  02/23/2025    Diabetic Alb to Cr ratio (uACR) test  03/29/2025    GFR test (Diabetes, CKD 3-4, OR last GFR 15-59)  03/29/2025    Flu vaccine (Season Ended) 11/12/2025 (Originally 8/1/2025)    A1C test (Diabetic or Prediabetic)  05/17/2025    Depression Screen  02/14/2026    Colorectal Cancer Screen  07/15/2026    Breast cancer screen  03/10/2027    Cervical cancer screen  01/25/2028    DTaP/Tdap/Td vaccine (3 - Td or Tdap) 01/28/2030    Hepatitis C screen  Completed    HIV screen  Completed    Hepatitis A vaccine  Aged Out    Hepatitis B vaccine  Aged Out    Hib vaccine  Aged Out    Polio vaccine  Aged Out    Meningococcal (ACWY) vaccine  Aged Out    Meningococcal B vaccine  Aged Out       Hemoglobin A1C (%)   Date Value   02/17/2025 7.3   11/12/2024 7.7   08/12/2024 7.6             ( goal A1C is < 7)   No components found for: \"LABMICR\"  No components found for: \"LDLCHOLESTEROL\", \"LDLCALC\"    (goal LDL is <100)   AST (U/L)   Date Value   03/29/2024 20     ALT (U/L)   Date Value   03/29/2024 20     BUN (mg/dL)   Date Value   03/29/2024 9     BP Readings from Last 3 Encounters:   02/17/25 130/74   01/21/25 121/71   11/12/24 124/70          (goal 120/80)    All Future Testing planned in [facility]  Lab Frequency Next

## 2025-06-17 ENCOUNTER — OFFICE VISIT (OUTPATIENT)
Dept: FAMILY MEDICINE CLINIC | Age: 60
End: 2025-06-17
Payer: COMMERCIAL

## 2025-06-17 VITALS
BODY MASS INDEX: 28.82 KG/M2 | DIASTOLIC BLOOD PRESSURE: 60 MMHG | WEIGHT: 184 LBS | SYSTOLIC BLOOD PRESSURE: 124 MMHG | OXYGEN SATURATION: 98 % | HEART RATE: 61 BPM

## 2025-06-17 DIAGNOSIS — E11.9 TYPE 2 DIABETES MELLITUS WITHOUT COMPLICATION, WITHOUT LONG-TERM CURRENT USE OF INSULIN (HCC): ICD-10-CM

## 2025-06-17 DIAGNOSIS — E78.2 MIXED HYPERLIPIDEMIA: Primary | ICD-10-CM

## 2025-06-17 DIAGNOSIS — Z13.29 SCREENING FOR THYROID DISORDER: ICD-10-CM

## 2025-06-17 LAB — HBA1C MFR BLD: 7.3 %

## 2025-06-17 PROCEDURE — 3051F HG A1C>EQUAL 7.0%<8.0%: CPT | Performed by: NURSE PRACTITIONER

## 2025-06-17 PROCEDURE — 83036 HEMOGLOBIN GLYCOSYLATED A1C: CPT | Performed by: NURSE PRACTITIONER

## 2025-06-17 PROCEDURE — 99214 OFFICE O/P EST MOD 30 MIN: CPT | Performed by: NURSE PRACTITIONER

## 2025-06-17 RX ORDER — ATORVASTATIN CALCIUM 40 MG/1
40 TABLET, FILM COATED ORAL DAILY
Qty: 90 TABLET | Refills: 1 | Status: SHIPPED | OUTPATIENT
Start: 2025-06-17

## 2025-06-17 RX ORDER — GLIMEPIRIDE 2 MG/1
2 TABLET ORAL EVERY MORNING
Qty: 90 TABLET | Refills: 1 | Status: SHIPPED | OUTPATIENT
Start: 2025-06-17

## 2025-06-17 RX ORDER — DAPAGLIFLOZIN 10 MG/1
10 TABLET, FILM COATED ORAL EVERY MORNING
Qty: 90 TABLET | Refills: 1 | Status: SHIPPED | OUTPATIENT
Start: 2025-06-17

## 2025-06-17 RX ORDER — ACYCLOVIR 800 MG/1
1 TABLET ORAL
Qty: 6 EACH | Refills: 1 | Status: SHIPPED | OUTPATIENT
Start: 2025-06-17

## 2025-06-17 ASSESSMENT — ENCOUNTER SYMPTOMS
COUGH: 0
SHORTNESS OF BREATH: 0

## 2025-06-17 NOTE — PROGRESS NOTES
60-74 years 1-dose series) Never done    Diabetic retinal exam  06/16/2024    COVID-19 Vaccine (1 - 2024-25 season) Never done    Diabetic foot exam  02/09/2025    Lipids  02/23/2025    Diabetic Alb to Cr ratio (uACR) test  03/29/2025    GFR test (Diabetes, CKD 3-4, OR last GFR 15-59)  03/29/2025    A1C test (Diabetic or Prediabetic)  05/17/2025    Flu vaccine (Season Ended) 11/12/2025 (Originally 8/1/2025)    Depression Screen  02/14/2026    Colorectal Cancer Screen  07/15/2026    Breast cancer screen  03/10/2027    Cervical cancer screen  01/25/2028    DTaP/Tdap/Td vaccine (3 - Td or Tdap) 01/28/2030    Hepatitis C screen  Completed    HIV screen  Completed    Hepatitis A vaccine  Aged Out    Hepatitis B vaccine  Aged Out    Hib vaccine  Aged Out    Polio vaccine  Aged Out    Meningococcal (ACWY) vaccine  Aged Out    Meningococcal B vaccine  Aged Out       Subjective:      Review of Systems   Constitutional:  Negative for chills and fever.   Respiratory:  Negative for cough and shortness of breath.    Cardiovascular:  Negative for chest pain, palpitations and leg swelling.          Objective:     Physical Exam  Constitutional:       Appearance: She is well-developed.   HENT:      Right Ear: External ear normal.      Left Ear: External ear normal.      Nose: Nose normal.   Cardiovascular:      Rate and Rhythm: Normal rate and regular rhythm.      Heart sounds: Normal heart sounds, S1 normal and S2 normal.   Pulmonary:      Effort: Pulmonary effort is normal. No respiratory distress.      Breath sounds: Normal breath sounds.   Musculoskeletal:         General: No deformity. Normal range of motion.      Cervical back: Full passive range of motion without pain and normal range of motion.   Skin:     General: Skin is warm and dry.   Neurological:      Mental Status: She is alert and oriented to person, place, and time.          Assessment/Plan:      1. Mixed hyperlipidemia  -     Lipid, Fasting; Future  2. Type 2

## 2025-06-24 ENCOUNTER — HOSPITAL ENCOUNTER (OUTPATIENT)
Age: 60
Setting detail: SPECIMEN
Discharge: HOME | End: 2025-06-24

## 2025-06-24 DIAGNOSIS — Z13.29 SCREENING FOR THYROID DISORDER: ICD-10-CM

## 2025-06-24 DIAGNOSIS — E11.9 TYPE 2 DIABETES MELLITUS WITHOUT COMPLICATION, WITHOUT LONG-TERM CURRENT USE OF INSULIN (HCC): ICD-10-CM

## 2025-06-24 DIAGNOSIS — E78.2 MIXED HYPERLIPIDEMIA: ICD-10-CM

## 2025-06-24 LAB
ALBUMIN SERPL-MCNC: 4.4 G/DL (ref 3.5–5.2)
ALBUMIN/GLOB SERPL: 1.6 {RATIO} (ref 1–2.5)
ALP SERPL-CCNC: 87 U/L (ref 35–104)
ALT SERPL-CCNC: 20 U/L (ref 10–35)
ANION GAP SERPL CALCULATED.3IONS-SCNC: 14 MMOL/L (ref 9–16)
AST SERPL-CCNC: 21 U/L (ref 10–35)
BILIRUB SERPL-MCNC: 0.4 MG/DL (ref 0–1.2)
BUN SERPL-MCNC: 12 MG/DL (ref 8–23)
CALCIUM SERPL-MCNC: 9.8 MG/DL (ref 8.6–10.4)
CHLORIDE SERPL-SCNC: 102 MMOL/L (ref 98–107)
CHOLEST SERPL-MCNC: 177 MG/DL (ref 0–199)
CHOLESTEROL/HDL RATIO: 2.1
CO2 SERPL-SCNC: 24 MMOL/L (ref 20–31)
CREAT SERPL-MCNC: 1 MG/DL (ref 0.6–0.9)
CREAT UR-MCNC: 129 MG/DL (ref 28–217)
ERYTHROCYTE [DISTWIDTH] IN BLOOD BY AUTOMATED COUNT: 14.3 % (ref 11.8–14.4)
GFR, ESTIMATED: 64 ML/MIN/1.73M2
GLUCOSE SERPL-MCNC: 132 MG/DL (ref 74–99)
HCT VFR BLD AUTO: 38.9 % (ref 36.3–47.1)
HDLC SERPL-MCNC: 83 MG/DL
HGB BLD-MCNC: 12.3 G/DL (ref 11.9–15.1)
LDLC SERPL CALC-MCNC: 77 MG/DL (ref 0–100)
MCH RBC QN AUTO: 29.7 PG (ref 25.2–33.5)
MCHC RBC AUTO-ENTMCNC: 31.6 G/DL (ref 28.4–34.8)
MCV RBC AUTO: 94 FL (ref 82.6–102.9)
MICROALBUMIN UR-MCNC: <12 MG/L (ref 0–20)
MICROALBUMIN/CREAT UR-RTO: NORMAL MCG/MG CREAT (ref 0–25)
NRBC BLD-RTO: 0 PER 100 WBC
PLATELET # BLD AUTO: 349 K/UL (ref 138–453)
PMV BLD AUTO: 10.1 FL (ref 8.1–13.5)
POTASSIUM SERPL-SCNC: 5.4 MMOL/L (ref 3.7–5.3)
PROT SERPL-MCNC: 7.1 G/DL (ref 6.6–8.7)
RBC # BLD AUTO: 4.14 M/UL (ref 3.95–5.11)
SODIUM SERPL-SCNC: 140 MMOL/L (ref 136–145)
TRIGL SERPL-MCNC: 85 MG/DL (ref 0–149)
TSH SERPL DL<=0.05 MIU/L-ACNC: 2.82 UIU/ML (ref 0.27–4.2)
VLDLC SERPL CALC-MCNC: 17 MG/DL (ref 1–30)
WBC OTHER # BLD: 7.7 K/UL (ref 3.5–11.3)

## 2025-06-25 ENCOUNTER — RESULTS FOLLOW-UP (OUTPATIENT)
Dept: FAMILY MEDICINE CLINIC | Age: 60
End: 2025-06-25

## (undated) DEVICE — P28, K-WIRE, 1.6 X 150 MM, SINGLE TROCAR, SMOOTH, SS
Type: IMPLANTABLE DEVICE | Site: FOOT | Status: NON-FUNCTIONAL
Brand: MULTI SYSTEM
Removed: 2022-01-20

## (undated) DEVICE — PRECISION THIN (5.5 X 0.38 X 11.5MM)

## (undated) DEVICE — SUTURE ETHLN SZ 4-0 L18IN NONABSORBABLE BLK L19MM PS-2 3/8 1667H

## (undated) DEVICE — Device

## (undated) DEVICE — DRAPE,REIN 53X77,STERILE: Brand: MEDLINE

## (undated) DEVICE — GLOVE SURG 8 11.7IN BEAD CUF LIGHT BRN SENSICARE LTX FREE

## (undated) DEVICE — SKIN PREP TRAY W/CHG: Brand: MEDLINE INDUSTRIES, INC.

## (undated) DEVICE — BLANKET WRM W29.9XL79.1IN UP BODY FORC AIR MISTRAL-AIR

## (undated) DEVICE — C-ARMOR C-ARM EQUIPMENT COVERS CLEAR STERILE UNIVERSAL FIT 12 PER CASE: Brand: C-ARMOR

## (undated) DEVICE — 4.0MM ROUND SOLID CARBIDE BUR MEDIUM

## (undated) DEVICE — SNARE ENDOSCP AD L240CM LOOP W10MM SHTH DIA2.4MM RND INSUL

## (undated) DEVICE — STAZ ENDO KIT: Brand: MEDLINE INDUSTRIES, INC.

## (undated) DEVICE — SPONGE LAP W18XL18IN WHT COT 4 PLY FLD STRUNG RADPQ DISP ST

## (undated) DEVICE — PRECISION THIN (9.0 X 0.38 X 25.0MM)

## (undated) DEVICE — SPLINT QUICK STEP SCOTCHCAST 5 X 30

## (undated) DEVICE — SUTURE VCRL SZ 2-0 L27IN ABSRB UD L26MM CT-2 1/2 CIR J269H

## (undated) DEVICE — BNDG,ELSTC,MATRIX,STRL,6"X5YD,LF,HOOK&LP: Brand: MEDLINE

## (undated) DEVICE — NEEDLE HYPO 25GA L1.5IN BLU POLYPR HUB S STL REG BVL STR

## (undated) DEVICE — PADDING CAST W6INXL4YD COT LO LINTING WYTEX

## (undated) DEVICE — YANKAUER,FLEXIBLE HANDLE,REGLR CAPACITY: Brand: MEDLINE INDUSTRIES, INC.

## (undated) DEVICE — INTENDED FOR TISSUE SEPARATION, AND OTHER PROCEDURES THAT REQUIRE A SHARP SURGICAL BLADE TO PUNCTURE OR CUT.: Brand: BARD-PARKER ® CARBON RIB-BACK BLADES

## (undated) DEVICE — GOWN,AURORA,NONRNF,XL,30/CS: Brand: MEDLINE

## (undated) DEVICE — SUTURE VCRL SZ 4-0 L27IN ABSRB UD L19MM PS-2 3/8 CIR PRIM J426H

## (undated) DEVICE — PADDING CAST W6INXL4YD ST COT RAYON MICROPLEATED HIGHLY

## (undated) DEVICE — SMALL TEAR CROSS CUT RASP (11.0 X 5.0MM)

## (undated) DEVICE — GLOVE SURG SZ 65 CRM LTX FREE POLYISOPRENE POLYMER BEAD ANTI

## (undated) DEVICE — TRAP SPEC POLYP REM STRNR CLN DSGN MAGNIFYING WIND DISP

## (undated) DEVICE — SOLUTION IV IRRIG 500ML 0.9% SODIUM CHL 2F7123

## (undated) DEVICE — CUFF REPROCESS BP TOURN SING BLDR 2 PORT 4X18IN

## (undated) DEVICE — APPLICATOR MEDICATED 26 CC SOLUTION HI LT ORNG CHLORAPREP

## (undated) DEVICE — BONE FENESTRATION PERFORATOR: Brand: BABY GORILLA®/GORILLA® PLATING SYSTEM

## (undated) DEVICE — SUTURE VCRL SZ 3-0 L27IN ABSRB UD L19MM PS-2 3/8 CIR PRIM J427H

## (undated) DEVICE — C-ARM: Brand: UNBRANDED

## (undated) DEVICE — PROTECTOR 0.054-.062IN WHT JURGAN BALL PIN GRD 6 BALL W SER

## (undated) DEVICE — GLOVE SURG SZ 7 CRM LTX FREE POLYISOPRENE POLYMER BEAD ANTI